# Patient Record
Sex: MALE | Race: WHITE | NOT HISPANIC OR LATINO | Employment: OTHER | URBAN - METROPOLITAN AREA
[De-identification: names, ages, dates, MRNs, and addresses within clinical notes are randomized per-mention and may not be internally consistent; named-entity substitution may affect disease eponyms.]

---

## 2017-03-09 ENCOUNTER — OFFICE VISIT (OUTPATIENT)
Dept: GASTROENTEROLOGY | Facility: CLINIC | Age: 52
End: 2017-03-09

## 2017-03-09 VITALS
HEIGHT: 72 IN | DIASTOLIC BLOOD PRESSURE: 88 MMHG | WEIGHT: 266 LBS | BODY MASS INDEX: 36.03 KG/M2 | SYSTOLIC BLOOD PRESSURE: 140 MMHG

## 2017-03-09 DIAGNOSIS — K50.111 CROHN'S COLITIS, WITH RECTAL BLEEDING (HCC): Primary | ICD-10-CM

## 2017-03-09 PROCEDURE — 99204 OFFICE O/P NEW MOD 45 MIN: CPT | Performed by: INTERNAL MEDICINE

## 2017-03-09 RX ORDER — MESALAMINE 1.2 G/1
4800 TABLET, DELAYED RELEASE ORAL
Qty: 120 TABLET | Refills: 11 | Status: SHIPPED | OUTPATIENT
Start: 2017-03-09 | End: 2017-09-19

## 2017-03-09 NOTE — PROGRESS NOTES
Chief Complaint   Patient presents with   • Crohn's Disease flare     Orlando Friedman is a 52 y.o. male who presents with a history of recurrent colitis   HPI     52-year-old male presents with recurrent rectal bleeding.  Patient reportedly was told 1 greater than 20 years ago that he had ulcerative colitis.  Patient presented approximately 3 years ago all most of the day with rectal bleeding to Dr. Navin Lee.  Colonoscopy at that time revealed colitis in the right colon as well as in the rectum.  Biopsies revealed chronic colitis with normal colon tree no segments.  Patient had been question whether this was Crohn's colitis or an overlap syndrome.  [facility] testing was recommended but not currently available.  At this point patient still with urgency for bowel movement as well as rectal bleeding.  Denies fever chills no weight loss.  Patient reports no sick contents with similar GI complaints.    Past Medical History   Diagnosis Date   • Crohn's disease        Current Outpatient Prescriptions:   •  ibuprofen (ADVIL,MOTRIN) 800 MG tablet, TAKE 1 TABLET 3 TIMES DAILY AS NEEDED., Disp: 90 tablet, Rfl: 1  •  Budesonide (UCERIS) 9 MG tablet sustained-release 24 hour, Take 1 tablet by mouth Daily., Disp: 14 tablet, Rfl: 0  •  HYDROcodone-acetaminophen (NORCO) 5-325 MG per tablet, Take 1-2 tablets by mouth every 4-6 hours prn for back pain., Disp: 40 tablet, Rfl: 0  •  mesalamine (LIALDA) 1.2 G EC tablet, Take 4 tablets by mouth Daily With Breakfast., Disp: 120 tablet, Rfl: 11  No Known Allergies  Social History     Social History   • Marital status:      Spouse name: N/A   • Number of children: N/A   • Years of education: N/A     Occupational History   • Not on file.     Social History Main Topics   • Smoking status: Former Smoker     Packs/day: 1.50     Types: Cigarettes     Quit date: 2009   • Smokeless tobacco: Not on file   • Alcohol use Yes   • Drug use: No   • Sexual activity: Not on file     Other Topics  Concern   • Not on file     Social History Narrative   • No narrative on file     History reviewed. No pertinent family history.  Review of Systems   Constitutional: Negative.    HENT: Negative.    Eyes: Negative.    Respiratory: Negative.    Cardiovascular: Negative.    Gastrointestinal: Positive for abdominal pain and blood in stool.   Endocrine: Negative.    Musculoskeletal: Negative.    Skin: Negative.    Allergic/Immunologic: Negative.    Hematological: Negative.      Vitals:    03/09/17 1104   BP: 140/88     Physical Exam   Constitutional: He is oriented to person, place, and time. He appears well-developed and well-nourished.   HENT:   Head: Normocephalic and atraumatic.   Eyes: Conjunctivae and EOM are normal. No scleral icterus.   Neck: Normal range of motion. No tracheal deviation present.   Cardiovascular: Normal rate and regular rhythm.  Exam reveals no gallop and no friction rub.    No murmur heard.  Pulmonary/Chest: Effort normal and breath sounds normal. No respiratory distress. He has no wheezes. He has no rales.   Abdominal: Soft. Bowel sounds are normal. He exhibits no distension and no mass. There is no tenderness. There is no rebound and no guarding.   Musculoskeletal: Normal range of motion. He exhibits no edema.   Lymphadenopathy:     He has no cervical adenopathy.   Neurological: He is alert and oriented to person, place, and time. No cranial nerve deficit.   Skin: Skin is warm and dry. No rash noted.   Psychiatric: He has a normal mood and affect. His behavior is normal.   Nursing note and vitals reviewed.    Diagnoses and all orders for this visit:    Crohn's colitis, with rectal bleeding  -     Case Request; Standing  -     Case Request    Other orders  -     Implement Anesthesia orders day of procedure.; Standing  -     Obtain informed consent; Standing  -     mesalamine (LIALDA) 1.2 G EC tablet; Take 4 tablets by mouth Daily With Breakfast.  -     Budesonide (UCERIS) 9 MG tablet  sustained-release 24 hour; Take 1 tablet by mouth Daily.     patient is a 52-year-old male with diagnosis of ulcerative colitis greater than 20 years ago.  Patient status post colonoscopy in 2014 that showed colitis, chronic, in the right colon and the rectum consistent with Crohn's colitis.  Patient with skip lesions with normal biopsies in between.  At the time patient complained of rectal bleeding and responded slowly to therapy.  Patient now returns with similar symptoms.  Patient reports urgency and the need to go the bathroom with little or no stool as well as rectal bleeding.  Patient reports trying the prescription of Rosa the that he got in 2014 without much success.  Patient now will be given new prescription, not 3 years old of Rosa the as well as a short course of Uceris to help boost the healing effect.  Once the bowel is more common treated will arrange repeat colonoscopy to evaluate the extent of disease.

## 2017-03-29 ENCOUNTER — HOSPITAL ENCOUNTER (OUTPATIENT)
Dept: GENERAL RADIOLOGY | Facility: HOSPITAL | Age: 52
Discharge: HOME | End: 2017-03-29
Admitting: FAMILY MEDICINE

## 2017-03-29 ENCOUNTER — OFFICE VISIT (OUTPATIENT)
Dept: FAMILY MEDICINE CLINIC | Facility: CLINIC | Age: 52
End: 2017-03-29

## 2017-03-29 VITALS
HEIGHT: 72 IN | WEIGHT: 268 LBS | SYSTOLIC BLOOD PRESSURE: 122 MMHG | TEMPERATURE: 97.8 F | OXYGEN SATURATION: 96 % | HEART RATE: 81 BPM | DIASTOLIC BLOOD PRESSURE: 82 MMHG | BODY MASS INDEX: 36.3 KG/M2

## 2017-03-29 DIAGNOSIS — M25.562 ACUTE PAIN OF LEFT KNEE: ICD-10-CM

## 2017-03-29 DIAGNOSIS — M25.562 ACUTE PAIN OF LEFT KNEE: Primary | ICD-10-CM

## 2017-03-29 DIAGNOSIS — K50.011 CROHN'S DISEASE OF SMALL INTESTINE WITH RECTAL BLEEDING (HCC): ICD-10-CM

## 2017-03-29 DIAGNOSIS — E66.09 EXOGENOUS OBESITY: ICD-10-CM

## 2017-03-29 DIAGNOSIS — M51.36 DEGENERATIVE DISC DISEASE, LUMBAR: ICD-10-CM

## 2017-03-29 PROCEDURE — 99213 OFFICE O/P EST LOW 20 MIN: CPT | Performed by: FAMILY MEDICINE

## 2017-03-29 PROCEDURE — 73562 X-RAY EXAM OF KNEE 3: CPT

## 2017-03-29 RX ORDER — HYDROCODONE BITARTRATE AND ACETAMINOPHEN 5; 325 MG/1; MG/1
TABLET ORAL
Qty: 40 TABLET | Refills: 0 | Status: SHIPPED | OUTPATIENT
Start: 2017-03-29 | End: 2017-09-19

## 2017-03-29 NOTE — PROGRESS NOTES
Subjective   Orlando Friedman is a 52 y.o. male with   Chief Complaint   Patient presents with   • Back Pain   • Knee Pain     left knee   .    History of Present Illness     Orlando is a 52 yr old white male that presents today for new onset of left knee pain.  He reports that he felt pain and a pop in his left knee after moving a few loads of rock with a wheel [place].  The pain was located to the lateral aspect of the left knee.  He has had no other injury to his left knee.  This happened on Saturday the 25th of March.  He reports that on Sunday the 26th his pain had mostly resolved.  He currently is without pain or effusion.  There has been no instability and the stiffness has resolved.    Orlando uses Norco for mild intermittent lower back pain secondary to degenerative disc disease of the lumbar spine and would like a refill.  His last fill of Norco was in August of 2016 of only 40 tablets that he has 10 left.  The use of Norco is very appropriate.      The following portions of the patient's history were reviewed and updated as appropriate: allergies, current medications, past family history, past medical history, past social history, past surgical history and problem list.    Review of Systems   Musculoskeletal: Positive for back pain.        Left knee pain   All other systems reviewed and are negative.      Objective     Vitals:    03/29/17 1439   BP: 122/82   Pulse: 81   Temp: 97.8 °F (36.6 °C)   SpO2: 96%     BP Readings from Last 3 Encounters:   03/29/17 122/82   03/09/17 140/88   12/22/15 140/84      Wt Readings from Last 3 Encounters:   03/29/17 268 lb (122 kg)   03/09/17 266 lb (121 kg)   12/22/15 272 lb 0.8 oz (123 kg)        Physical Exam   Constitutional: He is oriented to person, place, and time. He appears well-developed and well-nourished.   HENT:   Head: Normocephalic and atraumatic.   Pulmonary/Chest: Effort normal.   Musculoskeletal: Normal range of motion.        Left knee: He exhibits normal  range of motion, no swelling, no effusion, no ecchymosis, no deformity, no laceration, no erythema, no LCL laxity, normal patellar mobility, no bony tenderness, normal meniscus and no MCL laxity. No tenderness found. No medial joint line, no lateral joint line, no MCL, no LCL and no patellar tendon tenderness noted.   Left knee is observed without effusion, erythema, deformity or ecchymosis.  Minimal crepitance in the patellar region without patellar tenderness and entrapment is negative.  Ligaments are stable with negative Lachman and negative drawer sign.  Woodrow's is negative bilaterally.  Motor is 5 over 5 in neurovascular is intact distally.   Neurological: He is alert and oriented to person, place, and time. He has normal strength. No sensory deficit.   Skin: Skin is warm and dry. No rash noted. No erythema. No pallor.   Psychiatric: He has a normal mood and affect. His speech is normal and behavior is normal. Judgment and thought content normal. Cognition and memory are normal.   Nursing note and vitals reviewed.      Assessment/Plan   Orlando was seen today for back pain and knee pain.    Diagnoses and all orders for this visit:    Acute pain of left knee  -     XR Knee 3 View Left; Future    Degenerative disc disease, lumbar    Exogenous obesity    Crohn's disease of small intestine with rectal bleeding    Other orders  -     HYDROcodone-acetaminophen (NORCO) 5-325 MG per tablet; Take 1-2 tablets by mouth every 4-6 hours prn for back pain.        Return if symptoms worsen or fail to improve.    Scribed for Josue Harris MD by Harrison Mooney. 03/29/2017    IJosue MD personally performed the services described in this documentation, as scribed by Harrison Mooney in my presence, and it is both accurate and complete

## 2017-03-30 PROBLEM — E66.09 EXOGENOUS OBESITY: Status: ACTIVE | Noted: 2017-03-30

## 2017-03-30 PROBLEM — E78.5 HYPERLIPIDEMIA: Status: ACTIVE | Noted: 2017-03-30

## 2017-03-30 PROBLEM — N52.9 IMPOTENCE OF ORGANIC ORIGIN: Status: ACTIVE | Noted: 2017-03-30

## 2017-03-30 PROBLEM — N40.0 BENIGN PROSTATIC HYPERTROPHY WITHOUT URINARY OBSTRUCTION: Status: ACTIVE | Noted: 2017-03-30

## 2017-03-30 PROBLEM — E55.9 VITAMIN D DEFICIENCY: Status: ACTIVE | Noted: 2017-03-30

## 2017-03-30 PROBLEM — M51.36 DEGENERATIVE DISC DISEASE, LUMBAR: Status: ACTIVE | Noted: 2017-03-30

## 2017-03-30 PROBLEM — M51.369 DEGENERATIVE DISC DISEASE, LUMBAR: Status: ACTIVE | Noted: 2017-03-30

## 2017-03-30 PROBLEM — K50.011 CROHN'S DISEASE OF SMALL INTESTINE WITH RECTAL BLEEDING: Status: ACTIVE | Noted: 2017-03-30

## 2017-03-30 NOTE — PATIENT INSTRUCTIONS
X-rays of left knee will be obtained and activity will be as tolerated.  If episode recurs again will consider MRI of the left knee.  Hydrocodone will be refilled as has been done in the past.  Follow-up will be on an as-needed basis.

## 2017-05-31 ENCOUNTER — ANESTHESIA (OUTPATIENT)
Dept: GASTROENTEROLOGY | Facility: HOSPITAL | Age: 52
End: 2017-05-31

## 2017-05-31 ENCOUNTER — ANESTHESIA EVENT (OUTPATIENT)
Dept: GASTROENTEROLOGY | Facility: HOSPITAL | Age: 52
End: 2017-05-31

## 2017-05-31 ENCOUNTER — HOSPITAL ENCOUNTER (OUTPATIENT)
Facility: HOSPITAL | Age: 52
Setting detail: HOSPITAL OUTPATIENT SURGERY
Discharge: HOME | End: 2017-05-31
Attending: INTERNAL MEDICINE | Admitting: INTERNAL MEDICINE

## 2017-05-31 VITALS
TEMPERATURE: 98.5 F | HEIGHT: 72 IN | RESPIRATION RATE: 18 BRPM | SYSTOLIC BLOOD PRESSURE: 147 MMHG | HEART RATE: 80 BPM | OXYGEN SATURATION: 96 % | DIASTOLIC BLOOD PRESSURE: 96 MMHG | WEIGHT: 267.4 LBS | BODY MASS INDEX: 36.22 KG/M2

## 2017-05-31 DIAGNOSIS — K50.111 CROHN'S COLITIS, WITH RECTAL BLEEDING (HCC): ICD-10-CM

## 2017-05-31 PROBLEM — K50.90 CROHN'S DISEASE (HCC): Status: ACTIVE | Noted: 2017-05-31

## 2017-05-31 PROCEDURE — 45380 COLONOSCOPY AND BIOPSY: CPT | Performed by: INTERNAL MEDICINE

## 2017-05-31 PROCEDURE — S0260 H&P FOR SURGERY: HCPCS | Performed by: INTERNAL MEDICINE

## 2017-05-31 PROCEDURE — 88305 TISSUE EXAM BY PATHOLOGIST: CPT | Performed by: INTERNAL MEDICINE

## 2017-05-31 PROCEDURE — 25010000002 PROPOFOL 10 MG/ML EMULSION: Performed by: ANESTHESIOLOGY

## 2017-05-31 RX ORDER — SODIUM CHLORIDE, SODIUM LACTATE, POTASSIUM CHLORIDE, CALCIUM CHLORIDE 600; 310; 30; 20 MG/100ML; MG/100ML; MG/100ML; MG/100ML
1000 INJECTION, SOLUTION INTRAVENOUS CONTINUOUS PRN
Status: DISCONTINUED | OUTPATIENT
Start: 2017-05-31 | End: 2017-05-31 | Stop reason: HOSPADM

## 2017-05-31 RX ORDER — BUDESONIDE 3 MG/1
9 CAPSULE, COATED PELLETS ORAL EVERY MORNING
Qty: 90 CAPSULE | Refills: 11 | Status: SHIPPED | OUTPATIENT
Start: 2017-05-31 | End: 2017-09-19

## 2017-05-31 RX ORDER — PROPOFOL 10 MG/ML
VIAL (ML) INTRAVENOUS CONTINUOUS PRN
Status: DISCONTINUED | OUTPATIENT
Start: 2017-05-31 | End: 2017-05-31 | Stop reason: SURG

## 2017-05-31 RX ORDER — PROPOFOL 10 MG/ML
VIAL (ML) INTRAVENOUS CONTINUOUS PRN
Status: DISCONTINUED | OUTPATIENT
Start: 2017-05-31 | End: 2017-05-31

## 2017-05-31 RX ORDER — PROPOFOL 10 MG/ML
VIAL (ML) INTRAVENOUS AS NEEDED
Status: DISCONTINUED | OUTPATIENT
Start: 2017-05-31 | End: 2017-05-31 | Stop reason: SURG

## 2017-05-31 RX ORDER — LIDOCAINE HYDROCHLORIDE 20 MG/ML
INJECTION, SOLUTION INFILTRATION; PERINEURAL AS NEEDED
Status: DISCONTINUED | OUTPATIENT
Start: 2017-05-31 | End: 2017-05-31 | Stop reason: SURG

## 2017-05-31 RX ADMIN — PROPOFOL 160 MCG/KG/MIN: 10 INJECTION, EMULSION INTRAVENOUS at 09:36

## 2017-05-31 RX ADMIN — LIDOCAINE HYDROCHLORIDE 50 MG: 20 INJECTION, SOLUTION INFILTRATION; PERINEURAL at 09:34

## 2017-05-31 RX ADMIN — PROPOFOL 300 MG: 10 INJECTION, EMULSION INTRAVENOUS at 09:34

## 2017-05-31 RX ADMIN — SODIUM CHLORIDE, POTASSIUM CHLORIDE, SODIUM LACTATE AND CALCIUM CHLORIDE 1000 ML: 600; 310; 30; 20 INJECTION, SOLUTION INTRAVENOUS at 09:15

## 2017-06-01 LAB
CYTO UR: NORMAL
LAB AP CASE REPORT: NORMAL
Lab: NORMAL
PATH REPORT.FINAL DX SPEC: NORMAL
PATH REPORT.GROSS SPEC: NORMAL

## 2017-06-09 ENCOUNTER — TELEPHONE (OUTPATIENT)
Dept: GASTROENTEROLOGY | Facility: CLINIC | Age: 52
End: 2017-06-09

## 2017-06-09 NOTE — TELEPHONE ENCOUNTER
Called and spoke with pt's wife. Advised that Dr Herbert recommends for pt to stop taking the Lialda as this medication can occasionally cause diarrhea. Pt's wife verb understanding.

## 2017-06-09 NOTE — TELEPHONE ENCOUNTER
----- Message from Guy Fountain sent at 6/9/2017  9:36 AM EDT -----  Regarding: PT WIFE LENNIE CALLED  Contact: [phone]   PT WIFE LENNIE IS CALLING STATING HAD A SCOPE MAY 31ST & IS STILL HAVING PROBLEMS, PASSING GAS IN LIQUID FORM & HAS TO RUN TO THE BATHROOM, STOMACH PAIN.  GAVE THE PT SOME SAMPLES BUT DOESN'T REMEMBER WHAT THEY WERE CALLED, STATED ITS REALLY HELPED HIM & WOULD LIKE TO GET SOME MORE IF POSSIBLE.

## 2017-06-09 NOTE — TELEPHONE ENCOUNTER
Called pt's wife back. She states pt still having stomach cramping, he is passing a lot of gas and will sometimes pass stool too when he tries to pass gas, still having rectal bleeding (which has been going on for years). Pt's wife states pt just not getting nay better. Pt's wife states at the office visit, Dr Herbert gave pt some samples of a medication that she cannot remember the name of. She states pt took the med for about 10 days and it really helped. Asked if the samples were of Uceris. Pt's wife states yes, that sounds correct. Advised that after the scope, Dr Herbert called in budesonide which is the same active ingredient that is in Uceris. Asked if pt has been taking that. Pt's wife states yes, pt has been taking the Lialda and Uceris both every day. Advised will let Dr Herbert know and will call back with his recommendations. Pt's wife verb understanding.

## 2017-06-16 ENCOUNTER — TELEPHONE (OUTPATIENT)
Dept: GASTROENTEROLOGY | Facility: CLINIC | Age: 52
End: 2017-06-16

## 2017-06-16 NOTE — TELEPHONE ENCOUNTER
Returned spouse's phone call, she states the patient is having more rectal bleeding than in the past and increase abdominal pain.  Wanted to know what medications he can take? Advised an update will be sent to Dr. Herbert and will talk to  on Monday regarding an appointment. Advised if patient's symptoms worsen to go to the ER for an evaluation. She verb understanding and is agreeable to the plan.

## 2017-06-16 NOTE — TELEPHONE ENCOUNTER
----- Message from Guy Fountain sent at 6/16/2017  3:34 PM EDT -----  Regarding: PT WIFE LENNIE CALLED  Contact: [phone]   PT WIFE LENNIE IS CALLING STATED HER  IS HAVING SOME RECTAL BLEEDING & ABD PAIN. [facility] NEXT APPT WAS A MONTH AWAY & WIFE DOESN'T WANT TO WAIT THAT LONG NOR SEE A NP. SO SHE WOULD LIKE A CALL BACK TO DISCUSS WHAT HER  CAN TAKE.

## 2017-06-19 NOTE — TELEPHONE ENCOUNTER
Called patient's spouse, no answer, left message there is an opening for Dr. Herbert on 6/22/17@5233.  Advised to call back for confirmation.

## 2017-07-12 ENCOUNTER — TELEPHONE (OUTPATIENT)
Dept: FAMILY MEDICINE CLINIC | Facility: CLINIC | Age: 52
End: 2017-07-12

## 2017-07-12 DIAGNOSIS — IMO0002 CYST OF NECK: Primary | ICD-10-CM

## 2017-07-12 NOTE — TELEPHONE ENCOUNTER
"pts wife calling to request referral to doctor to have \"cyst\" removed from neck, has had done before.  "

## 2017-07-19 ENCOUNTER — TELEPHONE (OUTPATIENT)
Dept: GASTROENTEROLOGY | Facility: CLINIC | Age: 52
End: 2017-07-19

## 2017-07-19 RX ORDER — MESALAMINE 1000 MG/1
1000 SUPPOSITORY RECTAL NIGHTLY
Qty: 30 SUPPOSITORY | Refills: 0 | Status: SHIPPED | OUTPATIENT
Start: 2017-07-19 | End: 2017-08-30

## 2017-07-19 NOTE — TELEPHONE ENCOUNTER
Called pt and advised that Dr Bowling recommends that since it does not sound like he has gotten any better after stopping the Lialda, she recommends to resume the Lialda at 4 tablets per day and also continue taking the budesonide as well. Advised that she also sent a script for Canasa suppositories to his pharmacy for him to use at night time to help with his urgency and bleeding as it appears most of his inflammation was in his rectum on recent scope. Advised that he can continue using imodium as needed for diarrhea. Advised that she recommends to schedule a f/u with Dr Piedad BEACH upon his return. Advised can move his appt up so he sees MD sooner. Pt verb understanding and will call back to move appt up.

## 2017-07-19 NOTE — TELEPHONE ENCOUNTER
Called pt back. Pt states he's still having watery, runny diarrhea, he's still seeing bright red blood in his stool and having fecal urgency. Pt states he's having 5-10 BMs per day. Pt currently taking budesonide 3 capsules QAM and Imodium 4 capsules BID, depending on the day. If he's at home, he does not take Imodium. Pt states he stopped Lialda per Dr Herbert's instructions. Advised will send a message to one of Dr Herbert's associates for recommendations and will call back. Pt verb understanding.

## 2017-07-19 NOTE — TELEPHONE ENCOUNTER
Doesn't sound like he got any better after stopping lialda so would resume at 4 po daily, will also send canasa to his pharmacy to try for the urgency and bleeding as it appears that most of his inflammation was in the rectum on recent scope - schedule f/u with dr doyle kothari when he returns.  Ok to cont imodium as well + uceris (budesonide)

## 2017-07-19 NOTE — TELEPHONE ENCOUNTER
----- Message from Guy Fountain sent at 7/19/2017  8:40 AM EDT -----  Regarding: PT CALLED  Contact: [phone]   PT IS CALLING STATING IS STILL HAVING RUNNY BOWEL MOVEMENTS & BLOOD IN HIS STOOL. PT HAD A C/S IN MAY 2017. 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

## 2017-09-19 ENCOUNTER — OFFICE VISIT (OUTPATIENT)
Dept: SURGERY | Facility: CLINIC | Age: 52
End: 2017-09-19

## 2017-09-19 VITALS
OXYGEN SATURATION: 98 % | HEIGHT: 72 IN | WEIGHT: 276.1 LBS | SYSTOLIC BLOOD PRESSURE: 160 MMHG | BODY MASS INDEX: 37.4 KG/M2 | DIASTOLIC BLOOD PRESSURE: 100 MMHG | HEART RATE: 96 BPM

## 2017-09-19 DIAGNOSIS — L08.9 INFECTED SEBACEOUS CYST: Primary | ICD-10-CM

## 2017-09-19 DIAGNOSIS — L72.3 INFECTED SEBACEOUS CYST: Primary | ICD-10-CM

## 2017-09-19 PROCEDURE — 10060 I&D ABSCESS SIMPLE/SINGLE: CPT | Performed by: SURGERY

## 2017-09-19 NOTE — PROGRESS NOTES
Subjective   Orlando Friedman is a 52 y.o. male who presents to the office in surgical consultation from Josue Harris DO for an infected sebaceous cyst.    History of Present Illness     The patient has had sebaceous cyst in the past.  He recently developed a small sebaceous cyst of the upper back that then became a large and very painful.  He has not had any drainage.  There has been no trauma to the area.    Review of Systems   Constitutional: Negative for activity change, appetite change, fatigue and fever.   HENT: Negative for trouble swallowing and voice change.    Respiratory: Negative for chest tightness and shortness of breath.    Cardiovascular: Negative for chest pain and palpitations.   Gastrointestinal: Negative for abdominal pain, blood in stool, constipation, diarrhea, nausea and vomiting.   Endocrine: Negative for cold intolerance and heat intolerance.   Genitourinary: Negative for dysuria and flank pain.   Neurological: Negative for dizziness and light-headedness.   Hematological: Negative for adenopathy. Does not bruise/bleed easily.   Psychiatric/Behavioral: Negative for agitation and confusion.     Past Medical History:   Diagnosis Date   • Crohn's disease    • Skin cyst      Past Surgical History:   Procedure Laterality Date   • COLONOSCOPY  2013 approx    Crohns per patiient   • COLONOSCOPY N/A 5/31/2017    Procedure: COLONOSCOPY TO CECUM/TI WITH POLYPECTOMY (COLD BX) AND BIOPSY;  Surgeon: Erwin Herbert MD;  Location: [facility] ENDOSCOPY;  Service: cogested mucosa in rectum, chronic active inflammation moderate to severe, acute cryptitis,    • WISDOM TOOTH EXTRACTION       Family History   Problem Relation Age of Onset   • Breast cancer Sister      Social History     Social History   • Marital status:      Spouse name: N/A   • Number of children: N/A   • Years of education: N/A     Occupational History   •       Social History Main Topics   • Smoking status: Former Smoker      Packs/day: 1.50     Types: Cigarettes     Quit date: 2009   • Smokeless tobacco: Never Used   • Alcohol use Yes   • Drug use: No   • Sexual activity: Defer     Other Topics Concern   • Not on file     Social History Narrative       Objective   Physical Exam   Constitutional: He is oriented to person, place, and time. He appears well-developed and well-nourished.  Non-toxic appearance.   Eyes: EOM are normal. No scleral icterus.   Pulmonary/Chest: Effort normal. No respiratory distress.   Abdominal: Normal appearance.   Neurological: He is alert and oriented to person, place, and time.   Skin: Skin is warm and dry.   There is a 4 cm infected sebaceous cyst of the upper back with palpable fluctuance and overlying erythema.   Psychiatric: He has a normal mood and affect. His behavior is normal. Judgment and thought content normal.     Procedure  The area of the sebaceous cyst was prepped and draped in the standard surgical fashion.  It was infiltrated with 1% lidocaine without epinephrine.  An incision was made with a scalpel carried through the skin into the abscess cavity.  Purulent material was encountered and the abscess was completely evacuated.  It was irrigated with the local anesthetic.  The cavity was then packed with iodoform gauze.  A sterile dressing was applied.  The patient tolerated procedure well.    Assessment/Plan       The encounter diagnosis was Infected sebaceous cyst.    The patient had an infected sebaceous cyst of the upper back.  An incision and drainage of the abscess was performed in the office.  Local wound care was discussed with him.  He will return to the office if a cyst recurs.

## 2017-11-15 ENCOUNTER — TELEPHONE (OUTPATIENT)
Dept: GASTROENTEROLOGY | Facility: CLINIC | Age: 52
End: 2017-11-15

## 2017-11-15 NOTE — TELEPHONE ENCOUNTER
----- Message from Erwin Herbert MD sent at 11/15/2017  1:16 PM EST -----  Pt with chronic colitis, never came back for follow up.  Please call to schedule

## 2017-11-15 NOTE — TELEPHONE ENCOUNTER
Patient called, advised of Dr. Herbert's note. He states he could not talk at the moment and will call back.

## 2018-01-18 ENCOUNTER — OFFICE VISIT (OUTPATIENT)
Dept: FAMILY MEDICINE CLINIC | Facility: CLINIC | Age: 53
End: 2018-01-18

## 2018-01-18 VITALS
DIASTOLIC BLOOD PRESSURE: 82 MMHG | HEART RATE: 83 BPM | WEIGHT: 275 LBS | BODY MASS INDEX: 37.25 KG/M2 | OXYGEN SATURATION: 98 % | HEIGHT: 72 IN | TEMPERATURE: 99 F | SYSTOLIC BLOOD PRESSURE: 132 MMHG

## 2018-01-18 DIAGNOSIS — M51.36 DEGENERATIVE DISC DISEASE, LUMBAR: Primary | ICD-10-CM

## 2018-01-18 DIAGNOSIS — E66.09 EXOGENOUS OBESITY: ICD-10-CM

## 2018-01-18 PROCEDURE — 99213 OFFICE O/P EST LOW 20 MIN: CPT | Performed by: FAMILY MEDICINE

## 2018-01-18 RX ORDER — IBUPROFEN 800 MG/1
800 TABLET ORAL EVERY 8 HOURS PRN
Qty: 90 TABLET | Refills: 3 | Status: SHIPPED | OUTPATIENT
Start: 2018-01-18 | End: 2018-08-21 | Stop reason: SDUPTHER

## 2018-01-18 RX ORDER — HYDROCODONE BITARTRATE AND ACETAMINOPHEN 5; 325 MG/1; MG/1
1 TABLET ORAL EVERY 6 HOURS PRN
COMMUNITY
End: 2018-01-18 | Stop reason: SDUPTHER

## 2018-01-18 RX ORDER — IBUPROFEN 800 MG/1
800 TABLET ORAL EVERY 6 HOURS PRN
COMMUNITY
End: 2018-01-18 | Stop reason: SDUPTHER

## 2018-01-18 RX ORDER — HYDROCODONE BITARTRATE AND ACETAMINOPHEN 5; 325 MG/1; MG/1
1 TABLET ORAL EVERY 6 HOURS PRN
Qty: 40 TABLET | Refills: 0 | Status: SHIPPED | OUTPATIENT
Start: 2018-01-18 | End: 2018-08-23 | Stop reason: SDUPTHER

## 2018-01-19 NOTE — PROGRESS NOTES
Subjective   Orlando Friedman is a 52 y.o. male with   Chief Complaint   Patient presents with   • Back Pain     here for follow up for medication, Ibuprofen and hydrocodone.  He doesnt know strengths.   • Med Refill   .    History of Present Illness   52-year-old white male with known history of degenerative disc disease lumbar spine.  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  This process exacerbates his low back pain.  He does use prescriptive ibuprofen and on rare occasions uses hydrocodone.  He is eligible for [facility]CI but has not made the decision to do such.  He has been instructed in a low back home program that he admits to not participating in frequently.  He has also gained increased amounts weight over the last 5-6 years.  The following portions of the patient's history were reviewed and updated as appropriate: allergies, current medications, past family history, past medical history, past social history, past surgical history and problem list.    Review of Systems   Musculoskeletal: Positive for back pain.       Objective     Vitals:    01/18/18 1408   BP: 132/82   Pulse: 83   Temp: 99 °F (37.2 °C)   SpO2: 98%       No results found for this or any previous visit (from the past 168 hour(s)).    Physical Exam   Constitutional: He is oriented to person, place, and time. He appears well-developed and well-nourished.   HENT:   Head: Normocephalic and atraumatic.   Neurological: He is alert and oriented to person, place, and time.   Skin: Skin is warm. No rash noted.   Psychiatric: He has a normal mood and affect. His speech is normal and behavior is normal. Judgment and thought content normal. Cognition and memory are normal.   Nursing note and vitals reviewed.      Assessment/Plan   Orlando was seen today for back pain and med refill.    Diagnoses and  all orders for this visit:    Degenerative disc disease, lumbar  -     HYDROcodone-acetaminophen (NORCO) 5-325 MG per tablet; Take 1 tablet by mouth Every 6 (Six) Hours As Needed for Moderate Pain .  -     ibuprofen (ADVIL,MOTRIN) 800 MG tablet; Take 1 tablet by mouth Every 8 (Eight) Hours As Needed for Mild Pain .    Exogenous obesity        Return in about 6 months (around 7/18/2018) for Recheck.

## 2018-07-31 ENCOUNTER — OFFICE VISIT (OUTPATIENT)
Dept: SURGERY | Facility: CLINIC | Age: 53
End: 2018-07-31

## 2018-07-31 VITALS
WEIGHT: 275 LBS | OXYGEN SATURATION: 98 % | HEIGHT: 72 IN | DIASTOLIC BLOOD PRESSURE: 120 MMHG | HEART RATE: 73 BPM | SYSTOLIC BLOOD PRESSURE: 160 MMHG | BODY MASS INDEX: 37.25 KG/M2

## 2018-07-31 DIAGNOSIS — L72.3 SEBACEOUS CYST: Primary | ICD-10-CM

## 2018-07-31 PROCEDURE — 99213 OFFICE O/P EST LOW 20 MIN: CPT | Performed by: SURGERY

## 2018-07-31 RX ORDER — CEPHALEXIN 500 MG/1
500 CAPSULE ORAL 3 TIMES DAILY
Qty: 30 CAPSULE | Refills: 0 | Status: SHIPPED | OUTPATIENT
Start: 2018-07-31 | End: 2018-08-10

## 2018-07-31 NOTE — PROGRESS NOTES
Subjective   Orlando Friedman is a 53 y.o. male who returns to the office for a recurrent sebaceous cyst.    History of Present Illness     The patient had an infected sebaceous cyst and was seen in the office on 9/19/2018 where an incision and drainage of the abscess was performed.  He recovered well from that procedure and had no problems until the past several weeks when he has developed 2 sebaceous cysts.  One rapidly enlarged and was tender but has improved.  It never drained.  The second has remained small but is similar to the previous sebaceous cyst that was infected.    Review of Systems   Constitutional: Negative for activity change, appetite change, fatigue and fever.   HENT: Negative for trouble swallowing and voice change.    Respiratory: Negative for chest tightness and shortness of breath.    Cardiovascular: Negative for chest pain and palpitations.   Gastrointestinal: Negative for abdominal pain, blood in stool, constipation, diarrhea, nausea and vomiting.   Endocrine: Negative for cold intolerance and heat intolerance.   Genitourinary: Negative for dysuria and flank pain.   Neurological: Negative for dizziness and light-headedness.   Hematological: Negative for adenopathy. Does not bruise/bleed easily.   Psychiatric/Behavioral: Negative for agitation and confusion.     Past Medical History:   Diagnosis Date   • Crohn's disease (CMS/HCC)    • Skin cyst      Past Surgical History:   Procedure Laterality Date   • COLONOSCOPY  2013 approx    Crohns per patiient   • COLONOSCOPY N/A 5/31/2017    Procedure: COLONOSCOPY TO CECUM/TI WITH POLYPECTOMY (COLD BX) AND BIOPSY;  Surgeon: Erwin Herbert MD;  Location: [facility] ENDOSCOPY;  Service: cogested mucosa in rectum, chronic active inflammation moderate to severe, acute cryptitis,    • WISDOM TOOTH EXTRACTION       Family History   Problem Relation Age of Onset   • Breast cancer Sister      Social History     Social History   • Marital status:       Spouse name: N/A   • Number of children: N/A   • Years of education: N/A     Occupational History   •       Social History Main Topics   • Smoking status: Former Smoker     Packs/day: 1.50     Types: Cigarettes     Quit date: 2009   • Smokeless tobacco: Never Used   • Alcohol use Yes   • Drug use: No   • Sexual activity: Defer     Other Topics Concern   • Not on file     Social History Narrative   • No narrative on file       Objective   Physical Exam   Constitutional: He is oriented to person, place, and time. He appears well-developed and well-nourished.  Non-toxic appearance.   Eyes: EOM are normal. No scleral icterus.   Pulmonary/Chest: Effort normal. No respiratory distress.   Abdominal: Normal appearance.   Neurological: He is alert and oriented to person, place, and time.   Skin: Skin is warm and dry.   There are 2 separate sebaceous cyst of the upper back.  On the left upper back is a 2 cm sebaceous cyst with no erythema but mild fluctuance.  There is no tenderness.  Adjacent to this, closer to the midline, is a 1 cm uncomplicated sebaceous cyst.   Psychiatric: He has a normal mood and affect. His behavior is normal. Judgment and thought content normal.       Assessment/Plan       The encounter diagnosis was Sebaceous cyst.    The patient has 2 sebaceous cysts.  One has a low-grade infection and will be treated with Keflex.  Second is uncomplicated.  He has been scheduled for an excision of both the sebaceous cyst.  The patient understands the indications, alternatives, risks, and benefits of the procedure and wishes to proceed.

## 2018-08-01 ENCOUNTER — APPOINTMENT (OUTPATIENT)
Dept: PREADMISSION TESTING | Facility: HOSPITAL | Age: 53
End: 2018-08-01

## 2018-08-01 VITALS
WEIGHT: 278.8 LBS | BODY MASS INDEX: 37.76 KG/M2 | SYSTOLIC BLOOD PRESSURE: 167 MMHG | TEMPERATURE: 98.5 F | HEIGHT: 72 IN | OXYGEN SATURATION: 95 % | RESPIRATION RATE: 18 BRPM | DIASTOLIC BLOOD PRESSURE: 107 MMHG | HEART RATE: 81 BPM

## 2018-08-01 LAB
ANION GAP SERPL CALCULATED.3IONS-SCNC: 11.9 MMOL/L
BUN BLD-MCNC: 16 MG/DL (ref 6–20)
BUN/CREAT SERPL: 17.8 (ref 7–25)
CALCIUM SPEC-SCNC: 9.6 MG/DL (ref 8.6–10.5)
CHLORIDE SERPL-SCNC: 101 MMOL/L (ref 98–107)
CO2 SERPL-SCNC: 26.1 MMOL/L (ref 22–29)
CREAT BLD-MCNC: 0.9 MG/DL (ref 0.76–1.27)
DEPRECATED RDW RBC AUTO: 41.4 FL (ref 37–54)
ERYTHROCYTE [DISTWIDTH] IN BLOOD BY AUTOMATED COUNT: 12.9 % (ref 11.5–14.5)
GFR SERPL CREATININE-BSD FRML MDRD: 89 ML/MIN/1.73
GLUCOSE BLD-MCNC: 121 MG/DL (ref 65–99)
HCT VFR BLD AUTO: 44.8 % (ref 40.4–52.2)
HGB BLD-MCNC: 15 G/DL (ref 13.7–17.6)
MCH RBC QN AUTO: 29.8 PG (ref 27–32.7)
MCHC RBC AUTO-ENTMCNC: 33.5 G/DL (ref 32.6–36.4)
MCV RBC AUTO: 89.1 FL (ref 79.8–96.2)
PLATELET # BLD AUTO: 236 10*3/MM3 (ref 140–500)
PMV BLD AUTO: 10.3 FL (ref 6–12)
POTASSIUM BLD-SCNC: 4.5 MMOL/L (ref 3.5–5.2)
RBC # BLD AUTO: 5.03 10*6/MM3 (ref 4.6–6)
SODIUM BLD-SCNC: 139 MMOL/L (ref 136–145)
WBC NRBC COR # BLD: 5.97 10*3/MM3 (ref 4.5–10.7)

## 2018-08-01 PROCEDURE — 93010 ELECTROCARDIOGRAM REPORT: CPT | Performed by: INTERNAL MEDICINE

## 2018-08-01 PROCEDURE — 85027 COMPLETE CBC AUTOMATED: CPT | Performed by: SURGERY

## 2018-08-01 PROCEDURE — 36415 COLL VENOUS BLD VENIPUNCTURE: CPT

## 2018-08-01 PROCEDURE — 93005 ELECTROCARDIOGRAM TRACING: CPT

## 2018-08-01 PROCEDURE — 80048 BASIC METABOLIC PNL TOTAL CA: CPT | Performed by: SURGERY

## 2018-08-01 NOTE — DISCHARGE INSTRUCTIONS
PLEASE ARRIVE AT 1:00PM ON 8/1/2018      Take the following medications the morning of surgery with a small sip of water:        General Instructions:  • Do not eat solid food after midnight the night before surgery.  • You may drink clear liquids day of surgery but must stop at least one hour before your hospital arrival time. **STOP FLUIDS AT 12:00PM ON DAY OF SURGERY**  • It is beneficial for you to have a clear drink that contains carbohydrates the day of surgery.  We suggest a 12 to 20 ounce bottle of Gatorade or Powerade for non-diabetic patients or a 12 to 20 ounce bottle of G2 or Powerade Zero for diabetic patients. (Pediatric patients, are not advised to drink a 12 to 20 ounce carbohydrate drink)    Clear liquids are liquids you can see through.  Nothing red in color.     Plain water                               Sports drinks  Sodas                                   Gelatin (Jell-O)  Fruit juices without pulp such as white grape juice and apple juice  Popsicles that contain no fruit or yogurt  Tea or coffee (no cream or milk added)  Gatorade / Powerade  G2 / Powerade Zero    • Infants may have breast milk up to four hours before surgery.  • Infants drinking formula may drink formula up to six hours before surgery.   • Patients who avoid smoking, chewing tobacco and alcohol for 4 weeks prior to surgery have a reduced risk of post-operative complications.  Quit smoking as many days before surgery as you can.  • Do not smoke, use chewing tobacco or drink alcohol the day of surgery.   • If applicable bring your C-PAP/ BI-PAP machine.  • Bring any papers given to you in the doctor’s office.  • Wear clean comfortable clothes and socks.  • Do not wear contact lenses or make-up.  Bring a case for your glasses.   • Bring crutches or walker if applicable.  • Remove all piercings.  Leave jewelry and any other valuables at home.  • Hair extensions with metal clips must be removed prior to surgery.  • The Pre-Admission  Testing nurse will instruct you to bring medications if unable to obtain an accurate list in Pre-Admission Testing.          Preventing a Surgical Site Infection:  • For 2 to 3 days before surgery, avoid shaving with a razor because the razor can irritate skin and make it easier to develop an infection.    • Any areas of open skin can increase the risk of a post-operative wound infection by allowing bacteria to enter and travel throughout the body.  Notify your surgeon if you have any skin wounds / rashes even if it is not near the expected surgical site.  The area will need assessed to determine if surgery should be delayed until it is healed.  • The night prior to surgery sleep in a clean bed with clean clothing.  Do not allow pets to sleep with you.  • Shower on the morning of surgery using a fresh bar of anti-bacterial soap (such as Dial) and clean washcloth.  Dry with a clean towel and dress in clean clothing.  • Ask your surgeon if you will be receiving antibiotics prior to surgery.  • Make sure you, your family, and all healthcare providers clean their hands with soap and water or an alcohol based hand  before caring for you or your wound.    Day of surgery:  Upon arrival, a Pre-op nurse and Anesthesiologist will review your health history, obtain vital signs, and answer questions you may have.  The only belongings needed at this time will be your home medications and if applicable your C-PAP/BI-PAP machine.  If you are staying overnight your family can leave the rest of your belongings in the car and bring them to your room later.  A Pre-op nurse will start an IV and you may receive medication in preparation for surgery, including something to help you relax.  Your family will be able to see you in the Pre-op area.  While you are in surgery your family should notify the waiting room  if they leave the waiting room area and provide a contact phone number.    Please be aware that surgery  does come with discomfort.  We want to make every effort to control your discomfort so please discuss any uncontrolled symptoms with your nurse.   Your doctor will most likely have prescribed pain medications.      If you are going home after surgery you will receive individualized written care instructions before being discharged.  A responsible adult must drive you to and from the hospital on the day of your surgery and stay with you for 24 hours.    If you are staying overnight following surgery, you will be transported to your hospital room following the recovery period.  [facility] has all private rooms.    You have received a list of surgical assistants for your reference.  If you have any questions please call Pre-Admission Testing at [phone].  Deductibles and co-payments are collected on the day of service. Please be prepared to pay the required co-pay, deductible or deposit on the day of service as defined by your plan.

## 2018-08-06 ENCOUNTER — DOCUMENTATION (OUTPATIENT)
Dept: SURGERY | Facility: CLINIC | Age: 53
End: 2018-08-06

## 2018-08-06 DIAGNOSIS — R94.31 ABNORMAL EKG: Primary | ICD-10-CM

## 2018-08-06 NOTE — PROGRESS NOTES
The patient is scheduled to have an excision of a soft tissue neoplasm of his back.  Preop EKG showed T-wave abnormalities which were suspicious for ischemia.  The surgery has been canceled and he has been referred to cardiology for evaluation.

## 2018-08-07 NOTE — PROGRESS NOTES
Date of Office Visit: 2018  Encounter Provider: Rafaela Ramachandran MA  Place of Service: [facility]  Patient Name: Orlando Friedman  :1965    No chief complaint on file.  :     HPI: Orlando Friedman is a 53 y.o. male who presents today to ***      Past Medical History:   Diagnosis Date   • Back pain    • Colitis    • Crohn's disease (CMS/HCC)    • Dizziness    • Skin cyst    • Trouble swallowing        Past Surgical History:   Procedure Laterality Date   • COLONOSCOPY   approx    Crohns per patiient   • COLONOSCOPY N/A 2017    Procedure: COLONOSCOPY TO CECUM/TI WITH POLYPECTOMY (COLD BX) AND BIOPSY;  Surgeon: Erwin Herbert MD;  Location: [facility] ENDOSCOPY;  Service: cogested mucosa in rectum, chronic active inflammation moderate to severe, acute cryptitis,    • CYST REMOVAL     • WISDOM TOOTH EXTRACTION         Social History     Social History   • Marital status:      Spouse name: N/A   • Number of children: N/A   • Years of education: N/A     Occupational History   •       Social History Main Topics   • Smoking status: Former Smoker     Packs/day: 1.50     Types: Cigarettes     Quit date:    • Smokeless tobacco: Never Used   • Alcohol use Yes      Comment: WEEKENDS   • Drug use: No   • Sexual activity: Defer     Other Topics Concern   • Not on file     Social History Narrative   • No narrative on file       Family History   Problem Relation Age of Onset   • Breast cancer Sister    • Malig Hyperthermia Neg Hx        ROS    No Known Allergies      Current Outpatient Prescriptions:   •  cephalexin (KEFLEX) 500 MG capsule, Take 1 capsule by mouth 3 (Three) Times a Day for 10 days., Disp: 30 capsule, Rfl: 0  •  HYDROcodone-acetaminophen (NORCO) 5-325 MG per tablet, Take 1 tablet by mouth Every 6 (Six) Hours As Needed for Moderate Pain ., Disp: 40 tablet, Rfl: 0  •  ibuprofen (ADVIL,MOTRIN) 800 MG tablet, Take 1 tablet by mouth Every 8  (Eight) Hours As Needed for Mild Pain ., Disp: 90 tablet, Rfl: 3      Objective:     There were no vitals filed for this visit.  There is no height or weight on file to calculate BMI.    Physical Exam    Procedures      Assessment:      No diagnosis found.       Plan:       ***    As always, it has been a pleasure to participate in your patient's care.      Sincerely,       Rafaela Ramachandran MA

## 2018-08-07 NOTE — PROGRESS NOTES
Date of Office Visit: 2018  Encounter Provider: Michael De Luna MD  Place of Service: [facility] CARDIOLOGY  Patient Name: Orlando rFiedman  :1965    Chief Complaint   Patient presents with   • Pre-op Exam     CYST REMOVAL    • Abnormal ECG   :     HPI: Orlando Friedman is a 53 y.o. male who presents today in consultation for preoperative risk assessment at the request of Dr. Sauer.  He will have having sebaceous cysts removed under general anesthesia.  He went to [facility] and his EKG showed T wave inversions in III and aVF.      He denies chest pain, dyspnea, orthopnea, PND, palpitations, lightheadedness, syncope, or edema.  He denies any family history of premature CAD.  He denies diabetes, hyperlipidemia, or hypertension, although his BP has been very elevated recently.  He was diagnosed with RICH years ago but doesn't wear CPAP.    Past Medical History:   Diagnosis Date   • Back pain    • Benign prostatic hypertrophy without urinary obstruction 3/30/2017   • Colitis    • Crohn's disease (CMS/HCC)    • Degenerative disc disease, lumbar 3/30/2017   • Hyperlipidemia 3/30/2017   • Impotence of organic origin 3/30/2017   • Sebaceous cyst    • Vitamin D deficiency 3/30/2017       Past Surgical History:   Procedure Laterality Date   • COLONOSCOPY   approx    Crohns per patiient   • COLONOSCOPY N/A 2017    Procedure: COLONOSCOPY TO CECUM/TI WITH POLYPECTOMY (COLD BX) AND BIOPSY;  Surgeon: Erwin Herbert MD;  Location: [facility] ENDOSCOPY;  Service: cogested mucosa in rectum, chronic active inflammation moderate to severe, acute cryptitis,    • CYST REMOVAL     • WISDOM TOOTH EXTRACTION         Social History     Social History   • Marital status:      Spouse name: N/A   • Number of children: N/A   • Years of education: N/A     Occupational History   •       Social History Main Topics   • Smoking status: Former Smoker     Packs/day: 1.50     Types: Cigarettes      "Quit date: 2009   • Smokeless tobacco: Never Used      Comment: CAFFEINE USE: ONE CUP DAILY.   • Alcohol use 6.0 oz/week     10 Cans of beer per week      Comment: WEEKENDS   • Drug use: No   • Sexual activity: Defer     Other Topics Concern   • Not on file     Social History Narrative   • No narrative on file       Family History   Problem Relation Age of Onset   • Breast cancer Sister    • Sudden death Father 68   • Malig Hyperthermia Neg Hx        Review of Systems   Constitution: Positive for malaise/fatigue.   Cardiovascular: Negative for chest pain and palpitations.   Respiratory: Negative for shortness of breath.    Skin:        Sebaceous cyst   Neurological: Positive for light-headedness.        \"LIGHT-HEADED WHEN BENDING OVER\"   All other systems reviewed and are negative.      No Known Allergies      Current Outpatient Prescriptions:   •  cephalexin (KEFLEX) 500 MG capsule, Take 500 mg by mouth 3 (Three) Times a Day., Disp: , Rfl:   •  HYDROcodone-acetaminophen (NORCO) 5-325 MG per tablet, Take 1 tablet by mouth Every 6 (Six) Hours As Needed for Moderate Pain ., Disp: 40 tablet, Rfl: 0  •  ibuprofen (ADVIL,MOTRIN) 800 MG tablet, Take 1 tablet by mouth Every 8 (Eight) Hours As Needed for Mild Pain ., Disp: 90 tablet, Rfl: 3  •  omeprazole (priLOSEC) 40 MG capsule, TAKE 1 CAPSULE BY MOUTH EVERY DAY BEFORE A MEAL, Disp: , Rfl: 1      Objective:     Vitals:    08/13/18 0852   BP: (!) 140/102   BP Location: Left arm   Pulse: 86   Weight: 123 kg (272 lb)   Height: 182.9 cm (72\")     Body mass index is 36.89 kg/m².    Physical Exam   Constitutional: He is oriented to person, place, and time.   Obese   HENT:   Head: Normocephalic.   Nose: Nose normal.   Mouth/Throat: Oropharynx is clear and moist.   Eyes: Pupils are equal, round, and reactive to light. Conjunctivae and EOM are normal.   Neck: Normal range of motion.   Cannot assess for JVD due to habitus   Cardiovascular: Normal rate, regular rhythm, normal heart " sounds and intact distal pulses.    No murmur heard.  Pulmonary/Chest: Effort normal.   Abdominal: Soft.   Obesity limits abdominal exam   Musculoskeletal: Normal range of motion. He exhibits no edema.   Neurological: He is alert and oriented to person, place, and time. No cranial nerve deficit.   Skin: Skin is warm and dry. No rash noted.   Psychiatric: He has a normal mood and affect. His behavior is normal. Judgment and thought content normal.   Vitals reviewed.        ECG 12 Lead  Date/Time: 8/13/2018 12:59 PM  Performed by: MICHAEL DE LUNA  Authorized by: MICHAEL DE LUNA   Comparison: compared with previous ECG   Similar to previous ECG  Comparison to previous ECG: Lead placement change is noted  Rhythm: sinus rhythm  Conduction: conduction normal  ST Segments: ST segments normal  T depression: aVF  QRS axis: normal  Other: no other findings  Clinical impression: non-specific ECG              Assessment:       Diagnosis Plan   1. Abnormal EKG  Adult Transthoracic Echo Complete W/ Cont if Necessary Per Protocol   2. Elevated blood pressure reading in office without diagnosis of hypertension  Adult Transthoracic Echo Complete W/ Cont if Necessary Per Protocol   3. Obstructive sleep apnea syndrome  Adult Transthoracic Echo Complete W/ Cont if Necessary Per Protocol   4. Preop cardiovascular exam            Plan:       I suspect there was a lead placement issue on the first EKG.  We repeated it in our office today and the QRS morphology in lead III is completely different.  He has some nonspecific T wave abnormality in aVF.  I suspect this is hypertensive; I will check an echocardiogram.  He does not need a stress test prior to surgery.    His blood pressure is quite elevated.  He has untreated RICH and I asked him to get back in with sleep medicine and to follow up with Dr. Harris regarding his BP.       Sincerely,       Michael De Luna MD

## 2018-08-13 ENCOUNTER — OFFICE VISIT (OUTPATIENT)
Dept: CARDIOLOGY | Facility: CLINIC | Age: 53
End: 2018-08-13

## 2018-08-13 VITALS
BODY MASS INDEX: 36.84 KG/M2 | DIASTOLIC BLOOD PRESSURE: 102 MMHG | SYSTOLIC BLOOD PRESSURE: 140 MMHG | HEIGHT: 72 IN | WEIGHT: 272 LBS | HEART RATE: 86 BPM

## 2018-08-13 DIAGNOSIS — R94.31 ABNORMAL EKG: Primary | ICD-10-CM

## 2018-08-13 DIAGNOSIS — G47.33 OBSTRUCTIVE SLEEP APNEA SYNDROME: ICD-10-CM

## 2018-08-13 DIAGNOSIS — Z01.810 PREOP CARDIOVASCULAR EXAM: ICD-10-CM

## 2018-08-13 DIAGNOSIS — R03.0 ELEVATED BLOOD PRESSURE READING IN OFFICE WITHOUT DIAGNOSIS OF HYPERTENSION: ICD-10-CM

## 2018-08-13 PROCEDURE — 93000 ELECTROCARDIOGRAM COMPLETE: CPT | Performed by: INTERNAL MEDICINE

## 2018-08-13 PROCEDURE — 99244 OFF/OP CNSLTJ NEW/EST MOD 40: CPT | Performed by: INTERNAL MEDICINE

## 2018-08-13 RX ORDER — OMEPRAZOLE 40 MG/1
CAPSULE, DELAYED RELEASE ORAL
Refills: 1 | COMMUNITY
Start: 2018-08-02 | End: 2019-10-09

## 2018-08-13 RX ORDER — CEPHALEXIN 500 MG/1
500 CAPSULE ORAL 3 TIMES DAILY
COMMUNITY
End: 2018-08-21

## 2018-08-16 ENCOUNTER — DOCUMENTATION (OUTPATIENT)
Dept: CARDIOLOGY | Facility: CLINIC | Age: 53
End: 2018-08-16

## 2018-08-16 ENCOUNTER — OFFICE VISIT (OUTPATIENT)
Dept: SURGERY | Facility: CLINIC | Age: 53
End: 2018-08-16

## 2018-08-16 ENCOUNTER — HOSPITAL ENCOUNTER (OUTPATIENT)
Dept: CARDIOLOGY | Facility: HOSPITAL | Age: 53
Discharge: HOME | End: 2018-08-16
Attending: INTERNAL MEDICINE | Admitting: INTERNAL MEDICINE

## 2018-08-16 VITALS
HEART RATE: 66 BPM | WEIGHT: 276.4 LBS | BODY MASS INDEX: 37.49 KG/M2 | OXYGEN SATURATION: 97 % | SYSTOLIC BLOOD PRESSURE: 165 MMHG | DIASTOLIC BLOOD PRESSURE: 100 MMHG

## 2018-08-16 VITALS
SYSTOLIC BLOOD PRESSURE: 190 MMHG | DIASTOLIC BLOOD PRESSURE: 110 MMHG | BODY MASS INDEX: 36.84 KG/M2 | WEIGHT: 272 LBS | HEART RATE: 71 BPM | HEIGHT: 72 IN

## 2018-08-16 DIAGNOSIS — L72.3 INFECTED SEBACEOUS CYST: Primary | ICD-10-CM

## 2018-08-16 DIAGNOSIS — L08.9 INFECTED SEBACEOUS CYST: Primary | ICD-10-CM

## 2018-08-16 DIAGNOSIS — R03.0 ELEVATED BLOOD PRESSURE READING IN OFFICE WITHOUT DIAGNOSIS OF HYPERTENSION: ICD-10-CM

## 2018-08-16 DIAGNOSIS — R94.31 ABNORMAL EKG: ICD-10-CM

## 2018-08-16 DIAGNOSIS — G47.33 OBSTRUCTIVE SLEEP APNEA SYNDROME: ICD-10-CM

## 2018-08-16 PROCEDURE — 10060 I&D ABSCESS SIMPLE/SINGLE: CPT | Performed by: SURGERY

## 2018-08-16 PROCEDURE — 99212 OFFICE O/P EST SF 10 MIN: CPT | Performed by: SURGERY

## 2018-08-16 PROCEDURE — 93306 TTE W/DOPPLER COMPLETE: CPT | Performed by: INTERNAL MEDICINE

## 2018-08-16 PROCEDURE — 93306 TTE W/DOPPLER COMPLETE: CPT

## 2018-08-16 PROCEDURE — 25010000002 PERFLUTREN (DEFINITY) 8.476 MG IN SODIUM CHLORIDE 0.9 % 10 ML INJECTION: Performed by: INTERNAL MEDICINE

## 2018-08-16 RX ADMIN — PERFLUTREN 1.5 ML: 6.52 INJECTION, SUSPENSION INTRAVENOUS at 07:48

## 2018-08-16 NOTE — PROGRESS NOTES
Subjective   Orlando Friedman is a 53 y.o. male who presents to the office for an infected sebaceous cyst.    History of Present Illness     The patient was seen in the office on 7/31/2018 for 2 separate sebaceous cysts.  One was an infected sebaceous cyst with a low grade infection and was treated with antibiotics.  He was scheduled to have an excision of the sebaceous cysts but had an abnormal EKG that required a cardiac evaluation.  That evaluation was normal.  He now returns to the office because of increasing pain from the infected sebaceous cyst.  There has been no drainage.    Review of Systems   Constitutional: Negative for activity change, appetite change, fatigue and fever.   HENT: Negative for trouble swallowing and voice change.    Respiratory: Negative for chest tightness and shortness of breath.    Cardiovascular: Negative for chest pain and palpitations.   Gastrointestinal: Negative for abdominal pain, blood in stool, constipation, diarrhea, nausea and vomiting.   Endocrine: Negative for cold intolerance and heat intolerance.   Genitourinary: Negative for dysuria and flank pain.   Neurological: Negative for dizziness and light-headedness.   Hematological: Negative for adenopathy. Does not bruise/bleed easily.   Psychiatric/Behavioral: Negative for agitation and confusion.     Past Medical History:   Diagnosis Date   • Back pain    • Benign prostatic hypertrophy without urinary obstruction 3/30/2017   • Colitis    • Crohn's disease (CMS/HCC)    • Degenerative disc disease, lumbar 3/30/2017   • Hyperlipidemia 3/30/2017   • Impotence of organic origin 3/30/2017   • Sebaceous cyst    • Vitamin D deficiency 3/30/2017     Past Surgical History:   Procedure Laterality Date   • COLONOSCOPY  2013 approx    Crohns per patiient   • COLONOSCOPY N/A 5/31/2017    Procedure: COLONOSCOPY TO CECUM/TI WITH POLYPECTOMY (COLD BX) AND BIOPSY;  Surgeon: Erwin Herbert MD;  Location: [facility] ENDOSCOPY;  Service: [facility] [place]  mucosa in rectum, chronic active inflammation moderate to severe, acute cryptitis,    • CYST REMOVAL     • WISDOM TOOTH EXTRACTION       Family History   Problem Relation Age of Onset   • Breast cancer Sister    • Sudden death Father 68   • Malig Hyperthermia Neg Hx      Social History     Social History   • Marital status:      Spouse name: N/A   • Number of children: N/A   • Years of education: N/A     Occupational History   •       Social History Main Topics   • Smoking status: Former Smoker     Packs/day: 1.50     Types: Cigarettes     Quit date: 2009   • Smokeless tobacco: Never Used      Comment: CAFFEINE USE: ONE CUP DAILY.   • Alcohol use 6.0 oz/week     10 Cans of beer per week      Comment: WEEKENDS   • Drug use: No   • Sexual activity: Defer     Other Topics Concern   • Not on file     Social History Narrative   • No narrative on file       Objective   Physical Exam   Constitutional: He is oriented to person, place, and time. He appears well-developed and well-nourished.  Non-toxic appearance.   Eyes: EOM are normal. No scleral icterus.   Pulmonary/Chest: Effort normal. No respiratory distress.   Abdominal: Normal appearance.   Neurological: He is alert and oriented to person, place, and time.   Skin: Skin is warm and dry.   There is a 3 cm infected sebaceous cyst of the upper back with overlying cellulitis.  There is palpable fluctuance.   Psychiatric: He has a normal mood and affect. His behavior is normal. Judgment and thought content normal.     Procedure  The area of the infected sebaceous cyst was prepped and draped in the standard surgical fashion.  It was infiltrated with 1% lidocaine without epinephrine.  An incision was made and carried through the skin into the abscess cavity.  Purulent material was removed and the abscess was completely evacuated.  It was irrigated with the local anesthetic.  The abscess cavity was then packed with iodoform gauze and a dressing was applied.   The patient tolerated procedure well.    Assessment/Plan       The encounter diagnosis was Infected sebaceous cyst.    The patient has an infected sebaceous cyst.  An incision and drainage of the abscess was performed in the office.  Local wound care was discussed with him.  He will contact our office when he recovers from this procedure and is ready for an excision of all the sebaceous cysts.

## 2018-08-16 NOTE — PROGRESS NOTES
Date of Office Visit:  2018  Encounter Provider:  Michael De Luna MD  Place of Service:  [facility] CARDIOLOGY  Patient Name: Orlando Friedman  :  1965        Dear Lucia Harris and Camacho,    Mr. Friedman was noted to have nonspecific abnormalities on an EKG but had no worrisome cardiac symptoms, and he has a normal echocardiogram.    He is at low risk of major adverse cardiovascular events during GETA.    Please contact our office with any questions or concerns. As always, it has been a pleasure to participate in your patient's care.      Michael De Luna MD  [facility]

## 2018-08-17 LAB
ASCENDING AORTA: 3.2 CM
BH CV ECHO MEAS - ACS: 2.2 CM
BH CV ECHO MEAS - AO MAX PG (FULL): 2.8 MMHG
BH CV ECHO MEAS - AO MAX PG: 5.3 MMHG
BH CV ECHO MEAS - AO MEAN PG (FULL): 1.7 MMHG
BH CV ECHO MEAS - AO MEAN PG: 3.2 MMHG
BH CV ECHO MEAS - AO ROOT AREA (BSA CORRECTED): 1.3
BH CV ECHO MEAS - AO ROOT AREA: 7.9 CM^2
BH CV ECHO MEAS - AO ROOT DIAM: 3.2 CM
BH CV ECHO MEAS - AO V2 MAX: 114.8 CM/SEC
BH CV ECHO MEAS - AO V2 MEAN: 86.8 CM/SEC
BH CV ECHO MEAS - AO V2 VTI: 24.8 CM
BH CV ECHO MEAS - AVA(I,A): 3 CM^2
BH CV ECHO MEAS - AVA(I,D): 3 CM^2
BH CV ECHO MEAS - AVA(V,A): 2.6 CM^2
BH CV ECHO MEAS - AVA(V,D): 2.6 CM^2
BH CV ECHO MEAS - BSA(HAYCOCK): 2.6 M^2
BH CV ECHO MEAS - BSA: 2.4 M^2
BH CV ECHO MEAS - BZI_BMI: 37 KILOGRAMS/M^2
BH CV ECHO MEAS - BZI_METRIC_HEIGHT: 182.9 CM
BH CV ECHO MEAS - BZI_METRIC_WEIGHT: 123.8 KG
BH CV ECHO MEAS - CONTRAST EF (2CH): 55 CM2
BH CV ECHO MEAS - CONTRAST EF 4CH: 64 CM2
BH CV ECHO MEAS - EDV(TEICH): 110.9 ML
BH CV ECHO MEAS - EF(CUBED): 71.2 %
BH CV ECHO MEAS - EF(MOD-BP): 60 %
BH CV ECHO MEAS - EF(TEICH): 62.7 %
BH CV ECHO MEAS - ESV(TEICH): 41.4 ML
BH CV ECHO MEAS - FS: 33.9 %
BH CV ECHO MEAS - IVS/LVPW: 1.1
BH CV ECHO MEAS - IVSD: 1.4 CM
BH CV ECHO MEAS - LAT PEAK E' VEL: 10 CM/SEC
BH CV ECHO MEAS - LV MASS(C)D: 264 GRAMS
BH CV ECHO MEAS - LV MASS(C)DI: 108.6 GRAMS/M^2
BH CV ECHO MEAS - LV MAX PG: 2.5 MMHG
BH CV ECHO MEAS - LV MEAN PG: 1.5 MMHG
BH CV ECHO MEAS - LV V1 MAX: 79.1 CM/SEC
BH CV ECHO MEAS - LV V1 MEAN: 58.7 CM/SEC
BH CV ECHO MEAS - LV V1 VTI: 19.6 CM
BH CV ECHO MEAS - LVIDD: 4.9 CM
BH CV ECHO MEAS - LVIDS: 3.2 CM
BH CV ECHO MEAS - LVOT AREA (M): 3.8 CM^2
BH CV ECHO MEAS - LVOT AREA: 3.8 CM^2
BH CV ECHO MEAS - LVOT DIAM: 2.2 CM
BH CV ECHO MEAS - LVPWD: 1.3 CM
BH CV ECHO MEAS - MED PEAK E' VEL: 8 CM/SEC
BH CV ECHO MEAS - MR MAX PG: 25 MMHG
BH CV ECHO MEAS - MR MAX VEL: 250.2 CM/SEC
BH CV ECHO MEAS - MV A DUR: 0.12 SEC
BH CV ECHO MEAS - MV A MAX VEL: 69.4 CM/SEC
BH CV ECHO MEAS - MV DEC SLOPE: 307.6 CM/SEC^2
BH CV ECHO MEAS - MV DEC TIME: 0.27 SEC
BH CV ECHO MEAS - MV E MAX VEL: 81 CM/SEC
BH CV ECHO MEAS - MV E/A: 1.2
BH CV ECHO MEAS - MV MAX PG: 3.8 MMHG
BH CV ECHO MEAS - MV MEAN PG: 1.6 MMHG
BH CV ECHO MEAS - MV P1/2T MAX VEL: 79.1 CM/SEC
BH CV ECHO MEAS - MV P1/2T: 75.3 MSEC
BH CV ECHO MEAS - MV V2 MAX: 97.3 CM/SEC
BH CV ECHO MEAS - MV V2 MEAN: 59.4 CM/SEC
BH CV ECHO MEAS - MV V2 VTI: 29.1 CM
BH CV ECHO MEAS - MVA P1/2T LCG: 2.8 CM^2
BH CV ECHO MEAS - MVA(P1/2T): 2.9 CM^2
BH CV ECHO MEAS - MVA(VTI): 2.6 CM^2
BH CV ECHO MEAS - PA ACC TIME: 0.14 SEC
BH CV ECHO MEAS - PA MAX PG (FULL): 2.3 MMHG
BH CV ECHO MEAS - PA MAX PG: 3.5 MMHG
BH CV ECHO MEAS - PA PR(ACCEL): 15.6 MMHG
BH CV ECHO MEAS - PA V2 MAX: 93 CM/SEC
BH CV ECHO MEAS - PULM A REVS DUR: 0.11 SEC
BH CV ECHO MEAS - PULM A REVS VEL: 27.5 CM/SEC
BH CV ECHO MEAS - PULM DIAS VEL: 46.4 CM/SEC
BH CV ECHO MEAS - PULM S/D: 0.7
BH CV ECHO MEAS - PULM SYS VEL: 32.6 CM/SEC
BH CV ECHO MEAS - PVA(V,A): 1.9 CM^2
BH CV ECHO MEAS - PVA(V,D): 1.9 CM^2
BH CV ECHO MEAS - QP/QS: 0.47
BH CV ECHO MEAS - RAP SYSTOLE: 8 MMHG
BH CV ECHO MEAS - RV MAX PG: 1.2 MMHG
BH CV ECHO MEAS - RV MEAN PG: 0.56 MMHG
BH CV ECHO MEAS - RV V1 MAX: 53.8 CM/SEC
BH CV ECHO MEAS - RV V1 MEAN: 34.8 CM/SEC
BH CV ECHO MEAS - RV V1 VTI: 10.7 CM
BH CV ECHO MEAS - RVOT AREA: 3.3 CM^2
BH CV ECHO MEAS - RVOT DIAM: 2 CM
BH CV ECHO MEAS - RVSP: 8 MMHG
BH CV ECHO MEAS - SI(AO): 80.3 ML/M^2
BH CV ECHO MEAS - SI(CUBED): 33.7 ML/M^2
BH CV ECHO MEAS - SI(LVOT): 30.8 ML/M^2
BH CV ECHO MEAS - SI(TEICH): 28.6 ML/M^2
BH CV ECHO MEAS - SUP REN AO DIAM: 2.1 CM
BH CV ECHO MEAS - SV(AO): 195.3 ML
BH CV ECHO MEAS - SV(CUBED): 81.9 ML
BH CV ECHO MEAS - SV(LVOT): 74.8 ML
BH CV ECHO MEAS - SV(RVOT): 34.9 ML
BH CV ECHO MEAS - SV(TEICH): 69.5 ML
BH CV ECHO MEAS - TAPSE (>1.6): 1.7 CM2
BH CV ECHO MEASUREMENTS AVERAGE E/E' RATIO: 9
BH CV XLRA - RV BASE: 3.2 CM
BH CV XLRA - TDI S': 12 CM/SEC
LEFT ATRIUM VOLUME INDEX: 18 ML/M2
MAXIMAL PREDICTED HEART RATE: 169 BPM
SINUS: 3.6 CM
STJ: 2.8 CM
STRESS TARGET HR: 144 BPM

## 2018-08-21 ENCOUNTER — OFFICE VISIT (OUTPATIENT)
Dept: FAMILY MEDICINE CLINIC | Facility: CLINIC | Age: 53
End: 2018-08-21

## 2018-08-21 VITALS
DIASTOLIC BLOOD PRESSURE: 100 MMHG | HEIGHT: 72 IN | RESPIRATION RATE: 16 BRPM | SYSTOLIC BLOOD PRESSURE: 162 MMHG | OXYGEN SATURATION: 96 % | BODY MASS INDEX: 37.44 KG/M2 | TEMPERATURE: 98 F | HEART RATE: 92 BPM | WEIGHT: 276.4 LBS

## 2018-08-21 DIAGNOSIS — I10 ESSENTIAL HYPERTENSION: Primary | ICD-10-CM

## 2018-08-21 DIAGNOSIS — M51.36 DEGENERATIVE DISC DISEASE, LUMBAR: ICD-10-CM

## 2018-08-21 DIAGNOSIS — E66.09 EXOGENOUS OBESITY: ICD-10-CM

## 2018-08-21 DIAGNOSIS — G47.33 OSA (OBSTRUCTIVE SLEEP APNEA): ICD-10-CM

## 2018-08-21 PROBLEM — M54.9 BACK PAIN: Status: ACTIVE | Noted: 2018-08-21

## 2018-08-21 PROBLEM — M54.9 BACK PAIN: Status: RESOLVED | Noted: 2018-08-21 | Resolved: 2018-08-21

## 2018-08-21 PROCEDURE — 99214 OFFICE O/P EST MOD 30 MIN: CPT | Performed by: FAMILY MEDICINE

## 2018-08-21 RX ORDER — IBUPROFEN 800 MG/1
800 TABLET ORAL EVERY 8 HOURS PRN
Qty: 90 TABLET | Refills: 5 | Status: SHIPPED | OUTPATIENT
Start: 2018-08-21 | End: 2019-10-09 | Stop reason: SDUPTHER

## 2018-08-21 RX ORDER — HYDROCODONE BITARTRATE AND ACETAMINOPHEN 5; 325 MG/1; MG/1
1 TABLET ORAL EVERY 6 HOURS PRN
Qty: 40 TABLET | Refills: 0 | Status: CANCELLED | OUTPATIENT
Start: 2018-08-21

## 2018-08-21 RX ORDER — LISINOPRIL AND HYDROCHLOROTHIAZIDE 20; 12.5 MG/1; MG/1
1 TABLET ORAL DAILY
Qty: 30 TABLET | Refills: 1 | Status: SHIPPED | OUTPATIENT
Start: 2018-08-21 | End: 2018-09-06 | Stop reason: SDUPTHER

## 2018-08-21 NOTE — PATIENT INSTRUCTIONS
Return for fasting labs prior to next appointment.  Call if you develop a constant hacking cough which could be from the Lisinopril.

## 2018-08-21 NOTE — PROGRESS NOTES
Subjective   Orlando Friedman is a 53 y.o. male with   Chief Complaint   Patient presents with   • Med Refill     ibprofen, hydrocodone   • Back Pain   .    History of Present Illness     Pt is a 54 yo white male who presents to follow up on back pain.  He recently saw Dr De Luna of the cardiology service for abnormal EKG and unerwent  testing.  She wanted patient to follow up here in this office to handle his unstable HTN.  Elevated blood pressure has been present for quite some time with no treatment.  There have been no end organ symptoms.  Patient has long history of exogenous obesity.  Pt has been diagnosed with RICH and he picked up his CPAP today.  This however is a new diagnosis and he has not used his CPAP machine to date. Pt does not smoke.  Pt states its his low back that causes him pain, if he stands for extended periods of time.  There are no radicular symptoms.  Patient with long history of degenerative disc disease.  X-rays were obtained quite some time ago and patient has been to physical therapy.  He has no formal exercise program and does not do low back exercises at home.  He has been prescribed Hydrocodone 5/325 mg in the past and he takes it rarely.  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  Pt has no further questions or complaints at this time.    The following portions of the patient's history were reviewed and updated as appropriate: allergies, current medications, past family history, past medical history, past social history, past surgical history and problem list.    Review of Systems   Constitutional:        Exogenous obesity   Respiratory: Positive for apnea (RICH).    Cardiovascular: Positive for leg swelling (slight). Negative for chest pain and palpitations.        HTN   Musculoskeletal: Positive for back pain (low back).   All other systems reviewed and are negative.      Objective      Vitals:    08/21/18 1501   BP: 162/100   Pulse: 92   Resp: 16   Temp: 98 °F (36.7 °C)   SpO2: 96%     BP Readings from Last 3 Encounters:   08/21/18 162/100   08/16/18 (!) 190/110   08/16/18 165/100      Wt Readings from Last 3 Encounters:   08/21/18 125 kg (276 lb 6.4 oz)   08/16/18 123 kg (272 lb)   08/16/18 125 kg (276 lb 6.4 oz)        Physical Exam   Constitutional: He is oriented to person, place, and time. He appears well-developed and well-nourished.   Obese   HENT:   Head: Normocephalic and atraumatic.   Neck: Trachea normal and phonation normal. Neck supple. Normal carotid pulses present. Carotid bruit is not present. No thyroid mass and no thyromegaly present.   Cardiovascular: Normal rate, regular rhythm and normal heart sounds.  Exam reveals no gallop and no friction rub.    No murmur heard.  Trace peripheral pitting edema distally.   Pulmonary/Chest: Effort normal and breath sounds normal. No respiratory distress. He has no decreased breath sounds. He has no wheezes. He has no rhonchi. He has no rales.   Lymphadenopathy:     He has no cervical adenopathy.   Neurological: He is alert and oriented to person, place, and time.   Skin: Skin is warm and dry. No rash noted.   Psychiatric: He has a normal mood and affect. His speech is normal and behavior is normal. Judgment and thought content normal. Cognition and memory are normal.   Nursing note and vitals reviewed.      Assessment/Plan   Orlando was seen today for med refill and back pain.    Diagnoses and all orders for this visit:    Essential hypertension  -     lisinopril-hydrochlorothiazide (PRINZIDE,ZESTORETIC) 20-12.5 MG per tablet; Take 1 tablet by mouth Daily.    Degenerative disc disease, lumbar  -     ibuprofen (ADVIL,MOTRIN) 800 MG tablet; Take 1 tablet by mouth Every 8 (Eight) Hours As Needed for Mild Pain .  -     Ambulatory Referral to Physical Therapy Evaluate and treat    RICH (obstructive sleep apnea)    Exogenous obesity    Other  orders  -     Cancel: HYDROcodone-acetaminophen (NORCO) 5-325 MG per tablet; Take 1 tablet by mouth Every 6 (Six) Hours As Needed for Moderate Pain .      Patient Instructions   Return for fasting labs prior to next appointment.  Call if you develop a constant hacking cough which could be from the Lisinopril.      Return in about 1 month (around 9/21/2018).    Scribed for Josue Harris MD by Terri Jacob CMA. 08/21/2018    I, Josue Harris MD personally performed the services described in this documentation, as scribed by Terri Jacob CMA in my presence, and it is both accurate and complete

## 2018-08-23 DIAGNOSIS — M51.36 DEGENERATIVE DISC DISEASE, LUMBAR: ICD-10-CM

## 2018-08-23 PROBLEM — I10 ESSENTIAL HYPERTENSION: Status: ACTIVE | Noted: 2018-08-23

## 2018-08-23 PROBLEM — G47.33 OSA (OBSTRUCTIVE SLEEP APNEA): Status: ACTIVE | Noted: 2018-08-23

## 2018-08-23 RX ORDER — HYDROCODONE BITARTRATE AND ACETAMINOPHEN 5; 325 MG/1; MG/1
1 TABLET ORAL EVERY 6 HOURS PRN
Qty: 40 TABLET | Refills: 0 | Status: SHIPPED | OUTPATIENT
Start: 2018-08-23 | End: 2019-10-09 | Stop reason: SDUPTHER

## 2018-08-30 ENCOUNTER — TELEPHONE (OUTPATIENT)
Dept: FAMILY MEDICINE CLINIC | Facility: CLINIC | Age: 53
End: 2018-08-30

## 2018-08-30 RX ORDER — VALACYCLOVIR HYDROCHLORIDE 1 G/1
TABLET, FILM COATED ORAL
Qty: 20 TABLET | Refills: 0 | Status: SHIPPED | OUTPATIENT
Start: 2018-08-30 | End: 2021-01-04

## 2018-09-06 DIAGNOSIS — I10 ESSENTIAL HYPERTENSION: ICD-10-CM

## 2018-09-06 RX ORDER — LISINOPRIL AND HYDROCHLOROTHIAZIDE 20; 12.5 MG/1; MG/1
1 TABLET ORAL DAILY
Qty: 90 TABLET | Refills: 1 | Status: SHIPPED | OUTPATIENT
Start: 2018-09-06 | End: 2018-09-25 | Stop reason: SDUPTHER

## 2018-09-25 ENCOUNTER — TELEPHONE (OUTPATIENT)
Dept: FAMILY MEDICINE CLINIC | Facility: CLINIC | Age: 53
End: 2018-09-25

## 2018-09-25 DIAGNOSIS — I10 ESSENTIAL HYPERTENSION: ICD-10-CM

## 2018-09-25 RX ORDER — LISINOPRIL AND HYDROCHLOROTHIAZIDE 20; 12.5 MG/1; MG/1
1 TABLET ORAL DAILY
Qty: 90 TABLET | Refills: 1 | Status: SHIPPED | OUTPATIENT
Start: 2018-09-25 | End: 2018-09-26 | Stop reason: SDUPTHER

## 2018-09-26 DIAGNOSIS — I10 ESSENTIAL HYPERTENSION: ICD-10-CM

## 2018-09-26 RX ORDER — LISINOPRIL AND HYDROCHLOROTHIAZIDE 20; 12.5 MG/1; MG/1
1 TABLET ORAL DAILY
Qty: 90 TABLET | Refills: 1 | Status: SHIPPED | OUTPATIENT
Start: 2018-09-26 | End: 2019-10-09 | Stop reason: SDUPTHER

## 2019-06-18 ENCOUNTER — OFFICE VISIT (OUTPATIENT)
Dept: GASTROENTEROLOGY | Facility: CLINIC | Age: 54
End: 2019-06-18

## 2019-06-18 VITALS
TEMPERATURE: 98.3 F | WEIGHT: 252.6 LBS | SYSTOLIC BLOOD PRESSURE: 124 MMHG | DIASTOLIC BLOOD PRESSURE: 84 MMHG | HEIGHT: 72 IN | BODY MASS INDEX: 34.21 KG/M2

## 2019-06-18 DIAGNOSIS — K50.111 CROHN'S DISEASE OF LARGE INTESTINE WITH RECTAL BLEEDING (HCC): Primary | ICD-10-CM

## 2019-06-18 LAB
ALBUMIN SERPL-MCNC: 4.6 G/DL (ref 3.5–5.2)
ALBUMIN/GLOB SERPL: 1.8 G/DL
ALP SERPL-CCNC: 78 U/L (ref 39–117)
ALT SERPL-CCNC: 21 U/L (ref 1–41)
AST SERPL-CCNC: 16 U/L (ref 1–40)
BILIRUB SERPL-MCNC: 0.6 MG/DL (ref 0.2–1.2)
BUN SERPL-MCNC: 16 MG/DL (ref 6–20)
BUN/CREAT SERPL: 18 (ref 7–25)
CALCIUM SERPL-MCNC: 9.5 MG/DL (ref 8.6–10.5)
CHLORIDE SERPL-SCNC: 100 MMOL/L (ref 98–107)
CO2 SERPL-SCNC: 29.5 MMOL/L (ref 22–29)
CREAT SERPL-MCNC: 0.89 MG/DL (ref 0.76–1.27)
CRP SERPL-MCNC: 0.49 MG/DL (ref 0–0.5)
ERYTHROCYTE [DISTWIDTH] IN BLOOD BY AUTOMATED COUNT: 13.3 % (ref 12.3–15.4)
ERYTHROCYTE [SEDIMENTATION RATE] IN BLOOD BY WESTERGREN METHOD: 11 MM/HR (ref 0–20)
GLOBULIN SER CALC-MCNC: 2.6 GM/DL
GLUCOSE SERPL-MCNC: 104 MG/DL (ref 65–99)
HCT VFR BLD AUTO: 44.1 % (ref 37.5–51)
HGB BLD-MCNC: 13.9 G/DL (ref 13–17.7)
IRON SATN MFR SERPL: 21 % (ref 20–50)
IRON SERPL-MCNC: 88 MCG/DL (ref 59–158)
MCH RBC QN AUTO: 29 PG (ref 26.6–33)
MCHC RBC AUTO-ENTMCNC: 31.5 G/DL (ref 31.5–35.7)
MCV RBC AUTO: 91.9 FL (ref 79–97)
PLATELET # BLD AUTO: 258 10*3/MM3 (ref 140–450)
POTASSIUM SERPL-SCNC: 4.7 MMOL/L (ref 3.5–5.2)
PROT SERPL-MCNC: 7.2 G/DL (ref 6–8.5)
RBC # BLD AUTO: 4.8 10*6/MM3 (ref 4.14–5.8)
SODIUM SERPL-SCNC: 140 MMOL/L (ref 136–145)
TIBC SERPL-MCNC: 429 MCG/DL
UIBC SERPL-MCNC: 341 MCG/DL (ref 112–346)
WBC # BLD AUTO: 5.05 10*3/MM3 (ref 3.4–10.8)

## 2019-06-18 PROCEDURE — 99213 OFFICE O/P EST LOW 20 MIN: CPT | Performed by: INTERNAL MEDICINE

## 2019-06-18 RX ORDER — BUDESONIDE 9 MG/1
1 TABLET, FILM COATED, EXTENDED RELEASE ORAL DAILY
Qty: 30 TABLET | Refills: 11 | Status: SHIPPED | OUTPATIENT
Start: 2019-06-18 | End: 2020-07-30 | Stop reason: SDUPTHER

## 2019-06-18 RX ORDER — MESALAMINE 0.38 G/1
1500 CAPSULE, EXTENDED RELEASE ORAL DAILY
Qty: 120 CAPSULE | Refills: 11 | Status: SHIPPED | OUTPATIENT
Start: 2019-06-18 | End: 2019-10-09 | Stop reason: ALTCHOICE

## 2019-06-18 NOTE — PROGRESS NOTES
Chief Complaint   Patient presents with   • Rectal Bleeding   • Diarrhea       Orlando Friedman is a  54 y.o. male here for a follow up visit for rectal bleeding and diarrhea.    HPI     Patient 54-year-old male with history of Crohn's colitis as well as hyperlipidemia presenting for 6 months of increased symptoms.  Patient with diarrhea and rectal bleeding for over 6 months.  Patient reports symptoms have somewhat progressed to slowly.  Patient denies nausea and vomiting but concerned about what he can eat.  Patient reports about anything causes a stomach upset.  Patient reports trying to eat healthier but not succeeding very well due to his symptoms.    Past Medical History:   Diagnosis Date   • Back pain    • Benign prostatic hypertrophy without urinary obstruction 3/30/2017   • Colitis    • Crohn's disease (CMS/HCC)    • Degenerative disc disease, lumbar 3/30/2017   • Hyperlipidemia 3/30/2017   • Impotence of organic origin 3/30/2017   • Sebaceous cyst    • Vitamin D deficiency 3/30/2017         Current Outpatient Medications:   •  HYDROcodone-acetaminophen (NORCO) 5-325 MG per tablet, Take 1 tablet by mouth Every 6 (Six) Hours As Needed for Moderate Pain ., Disp: 40 tablet, Rfl: 0  •  ibuprofen (ADVIL,MOTRIN) 800 MG tablet, Take 1 tablet by mouth Every 8 (Eight) Hours As Needed for Mild Pain ., Disp: 90 tablet, Rfl: 5  •  lisinopril-hydrochlorothiazide (PRINZIDE,ZESTORETIC) 20-12.5 MG per tablet, Take 1 tablet by mouth Daily., Disp: 90 tablet, Rfl: 1  •  Probiotic Product (PROBIOTIC PO), Take  by mouth., Disp: , Rfl:   •  Budesonide ER (UCERIS) 9 MG tablet sustained-release 24 hour, Take 1 tablet by mouth Daily., Disp: 30 tablet, Rfl: 11  •  mesalamine (APRISO) 0.375 g 24 hr capsule, Take 4 capsules by mouth Daily., Disp: 120 capsule, Rfl: 11  •  omeprazole (priLOSEC) 40 MG capsule, TAKE 1 CAPSULE BY MOUTH EVERY DAY BEFORE A MEAL, Disp: , Rfl: 1  •  valACYclovir (VALTREX) 1000 MG tablet, 2 po BID x 1 day with  outbreak, Disp: 20 tablet, Rfl: 0    No Known Allergies    Social History     Socioeconomic History   • Marital status:      Spouse name: Not on file   • Number of children: Not on file   • Years of education: Not on file   • Highest education level: Not on file   Occupational History   • Occupation:    Tobacco Use   • Smoking status: Former Smoker     Packs/day: 1.50     Types: Cigarettes     Last attempt to quit: 2009     Years since quitting: 10.4   • Smokeless tobacco: Never Used   • Tobacco comment: CAFFEINE USE: ONE CUP DAILY.   Substance and Sexual Activity   • Alcohol use: Yes     Alcohol/week: 6.0 oz     Types: 10 Cans of beer per week   • Drug use: No   • Sexual activity: Defer       Family History   Problem Relation Age of Onset   • Breast cancer Sister    • Sudden death Father 68   • Malig Hyperthermia Neg Hx        Review of Systems   Constitutional: Negative.    Respiratory: Negative.    Cardiovascular: Negative.    Gastrointestinal: Positive for abdominal pain, blood in stool and diarrhea. Negative for abdominal distention, constipation and rectal pain.   Musculoskeletal: Negative.    Hematological: Negative.        Vitals:    06/18/19 0937   BP: 124/84   Temp: 98.3 °F (36.8 °C)       Physical Exam   Constitutional: He is oriented to person, place, and time. He appears well-developed and well-nourished.   HENT:   Head: Normocephalic and atraumatic.   Eyes: Pupils are equal, round, and reactive to light. No scleral icterus.   Cardiovascular: Normal rate and regular rhythm.   Pulmonary/Chest: Effort normal.   Abdominal: Soft. Bowel sounds are normal. He exhibits no distension and no mass. There is no tenderness. No hernia.   Neurological: He is alert and oriented to person, place, and time.   Skin: Skin is warm and dry. No rash noted.   Psychiatric: He has a normal mood and affect. His behavior is normal.   Vitals reviewed.      No visits with results within 2 Month(s) from this visit.    Latest known visit with results is:   Hospital Outpatient Visit on 08/16/2018   Component Date Value Ref Range Status   • BSA 08/16/2018 2.4  m^2 Final   • IVSd 08/16/2018 1.4  cm Final   • LVIDd 08/16/2018 4.9  cm Final   • LVIDs 08/16/2018 3.2  cm Final   • LVPWd 08/16/2018 1.3  cm Final   • IVS/LVPW 08/16/2018 1.1   Final   • FS 08/16/2018 33.9  % Final   • EDV(Teich) 08/16/2018 110.9  ml Final   • ESV(Teich) 08/16/2018 41.4  ml Final   • EF(Teich) 08/16/2018 62.7  % Final   • EF(cubed) 08/16/2018 71.2  % Final   • LV mass(C)d 08/16/2018 264.0  grams Final   • LV mass(C)dI 08/16/2018 108.6  grams/m^2 Final   • SV(Teich) 08/16/2018 69.5  ml Final   • SI(Teich) 08/16/2018 28.6  ml/m^2 Final   • SV(cubed) 08/16/2018 81.9  ml Final   • SI(cubed) 08/16/2018 33.7  ml/m^2 Final   • Ao root diam 08/16/2018 3.2  cm Final   • Ao root area 08/16/2018 7.9  cm^2 Final   • ACS 08/16/2018 2.2  cm Final   • LVOT diam 08/16/2018 2.2  cm Final   • LVOT area 08/16/2018 3.8  cm^2 Final   • LVOT area(traced) 08/16/2018 3.8  cm^2 Final   • RVOT diam 08/16/2018 2.0  cm Final   • RVOT area 08/16/2018 3.3  cm^2 Final   • Ao root area (BSA corrected) 08/16/2018 1.3   Final   • MV A dur 08/16/2018 0.12  sec Final   • MV E max elijah 08/16/2018 81.0  cm/sec Final   • MV A max elijah 08/16/2018 69.4  cm/sec Final   • MV E/A 08/16/2018 1.2   Final   • MV V2 max 08/16/2018 97.3  cm/sec Final   • MV max PG 08/16/2018 3.8  mmHg Final   • MV V2 mean 08/16/2018 59.4  cm/sec Final   • MV mean PG 08/16/2018 1.6  mmHg Final   • MV V2 VTI 08/16/2018 29.1  cm Final   • MVA(VTI) 08/16/2018 2.6  cm^2 Final   • MV P1/2t max elijah 08/16/2018 79.1  cm/sec Final   • MV P1/2t 08/16/2018 75.3  msec Final   • MVA(P1/2t) 08/16/2018 2.9  cm^2 Final   • MV dec slope 08/16/2018 307.6  cm/sec^2 Final   • MV dec time 08/16/2018 0.27  sec Final   • Ao pk elijah 08/16/2018 114.8  cm/sec Final   • Ao max PG 08/16/2018 5.3  mmHg Final   • Ao max PG (full) 08/16/2018 2.8  mmHg  Final   • Ao V2 mean 08/16/2018 86.8  cm/sec Final   • Ao mean PG 08/16/2018 3.2  mmHg Final   • Ao mean PG (full) 08/16/2018 1.7  mmHg Final   • Ao V2 VTI 08/16/2018 24.8  cm Final   • CATRINA(I,A) 08/16/2018 3.0  cm^2 Final   • CATRINA(I,D) 08/16/2018 3.0  cm^2 Final   • CATRINA(V,A) 08/16/2018 2.6  cm^2 Final   • CATRINA(V,D) 08/16/2018 2.6  cm^2 Final   • LV V1 max PG 08/16/2018 2.5  mmHg Final   • LV V1 mean PG 08/16/2018 1.5  mmHg Final   • LV V1 max 08/16/2018 79.1  cm/sec Final   • LV V1 mean 08/16/2018 58.7  cm/sec Final   • LV V1 VTI 08/16/2018 19.6  cm Final   • MR max too 08/16/2018 250.2  cm/sec Final   • MR max PG 08/16/2018 25.0  mmHg Final   • SV(Ao) 08/16/2018 195.3  ml Final   • SI(Ao) 08/16/2018 80.3  ml/m^2 Final   • SV(LVOT) 08/16/2018 74.8  ml Final   • SV(RVOT) 08/16/2018 34.9  ml Final   • SI(LVOT) 08/16/2018 30.8  ml/m^2 Final   • PA V2 max 08/16/2018 93.0  cm/sec Final   • PA max PG 08/16/2018 3.5  mmHg Final   • PA max PG (full) 08/16/2018 2.3  mmHg Final   • BH CV ECHO SAFIA - PVA(V,A) 08/16/2018 1.9  cm^2 Final   • BH CV ECHO SAFIA - PVA(V,D) 08/16/2018 1.9  cm^2 Final   • PA acc time 08/16/2018 0.14  sec Final   • RV V1 max PG 08/16/2018 1.2  mmHg Final   • RV V1 mean PG 08/16/2018 0.56  mmHg Final   • RV V1 max 08/16/2018 53.8  cm/sec Final   • RV V1 mean 08/16/2018 34.8  cm/sec Final   • RV V1 VTI 08/16/2018 10.7  cm Final   • PA pr(Accel) 08/16/2018 15.6  mmHg Final   • Pulm Sys Too 08/16/2018 32.6  cm/sec Final   • Pulm Etienne Too 08/16/2018 46.4  cm/sec Final   • Pulm S/D 08/16/2018 0.7   Final   • Qp/Qs 08/16/2018 0.47   Final   • Pulm A Revs Dur 08/16/2018 0.11  sec Final   • Pulm A Revs Too 08/16/2018 27.5  cm/sec Final   • MVA P1/2T LCG 08/16/2018 2.8  cm^2 Final   • BH CV ECHO SAFIA - BZI_BMI 08/16/2018 37.0  kilograms/m^2 Final   • BH CV ECHO SAFIA - BSA(HAYCOCK) 08/16/2018 2.6  m^2 Final   • BH CV ECHO SAFIA - BZI_METRIC_WEIGHT 08/16/2018 123.8  kg Final   • BH CV ECHO SAFIA - BZI_METRIC_HEIGHT  08/16/2018 182.9  cm Final   • TDI S' 08/16/2018 12.00  cm/sec Final   • RV Base 08/16/2018 3.20  cm Final   • Sinus 08/16/2018 3.60  cm Final   • STJ 08/16/2018 2.80  cm Final   • Ascending aorta 08/16/2018 3.20  cm Final   • LA Volume Index 08/16/2018 18.0  mL/m2 Final   • Avg E/e' ratio 08/16/2018 9.00   Final   • EF(MOD-bp) 08/16/2018 60  % Final   • Lat Peak E' Too 08/16/2018 10.0  cm/sec Final   • Med Peak E' Too 08/16/2018 8.00  cm/sec Final   • RAP systole 08/16/2018 8  mmHg Final   • RVSP(TR) 08/16/2018 8  mmHg Final   • Abdo Ao Diam 08/16/2018 2.10  cm Final   • EF - Contrast (2Ch) 08/16/2018 55.0  cm2 Final   • EF - Contrast (4Ch) 08/16/2018 64.0  cm2 Final   • TAPSE (>1.6) 08/16/2018 1.70  cm2 Final   • Target HR (85%) 08/16/2018 144  bpm Final   • Max. Pred. HR (100%) 08/16/2018 169  bpm Final       Orlando was seen today for rectal bleeding and diarrhea.    Diagnoses and all orders for this visit:    Crohn's disease of large intestine with rectal bleeding (CMS/HCC)  -     CBC (No Diff)  -     Comprehensive Metabolic Panel  -     Iron Profile  -     C-reactive Protein  -     Sedimentation Rate    Other orders  -     mesalamine (APRISO) 0.375 g 24 hr capsule; Take 4 capsules by mouth Daily.  -     Budesonide ER (UCERIS) 9 MG tablet sustained-release 24 hour; Take 1 tablet by mouth Daily.      Patient 54-year-old male with history of Crohn's colitis presents in follow-up.  Patient reports 6 months of diarrhea and rectal bleeding here for follow-up.  Patient instructed next time to call sooner and not wait so long so we can improve his issues more quickly.  Explained that as time goes on with the disease the injury becomes more extensive than more healing required to reverse it.  For now we will begin a pre-so as Lialda caused more diarrhea.  We will also add Uceris to the regimen until symptoms improve.  Will follow clinically for response.  We will check his labs today for signs of iron deficiency and  supplement as needed.

## 2019-07-09 ENCOUNTER — TELEPHONE (OUTPATIENT)
Dept: GASTROENTEROLOGY | Facility: CLINIC | Age: 54
End: 2019-07-09

## 2019-07-09 NOTE — TELEPHONE ENCOUNTER
Patient called, no answer, left message(VM identified patient) advising as per Dr. Herbert's note. Advised to call back for questions/concerns.

## 2019-07-09 NOTE — TELEPHONE ENCOUNTER
----- Message from Erwin Herbert MD sent at 7/4/2019 12:04 PM EDT -----  Labs all normal, no evidence of active colitis or anemia.  Continue current meds and call if symptoms not improved.

## 2019-07-17 ENCOUNTER — TELEPHONE (OUTPATIENT)
Dept: GASTROENTEROLOGY | Facility: CLINIC | Age: 54
End: 2019-07-17

## 2019-07-17 DIAGNOSIS — K51.911 ULCERATIVE COLITIS WITH RECTAL BLEEDING, UNSPECIFIED LOCATION (HCC): Primary | ICD-10-CM

## 2019-07-17 NOTE — TELEPHONE ENCOUNTER
Returned patient's phone call. He states he has been taking the Apriso 1500mg and Budesonide 9 mg every day since his last visit with Dr. Herbert(6/19/19). Reports an improvement with abdominal cramping but continues with loose stool(close to watery consistency) with bright red blood mixed in the stool. He reports fecal urgency but not incontinence. Questions if there is another medication to take or add to his regimen. Advised will send an update to HAY Pizano. He verb understanding.

## 2019-07-17 NOTE — TELEPHONE ENCOUNTER
Lets have him complete GI PCR and C. difficile stool testing.  Looks like he had been having symptoms for about 6 months before he was started on medication.  Has he been on prednisone in the past?  Thanks BG

## 2019-07-17 NOTE — TELEPHONE ENCOUNTER
----- Message from Sean Egan sent at 7/17/2019 11:02 AM EDT -----  Regarding: APRISO- BUDESONIDE  Contact: [phone]  MEDICATION IS SOMEWHAT WORKING. STILL HAVING LOOSE STOOL AND SOME BLOOD..

## 2019-07-17 NOTE — TELEPHONE ENCOUNTER
Returned patient's phone call. Advised as per HAY Pizano's note. When asked if he has been on Prednisone in the past, he states yes but its been a long time ago. He verb understanding of the note and is in agreement with the plan. Stool kit placed at  for patient to  at his convenience.

## 2019-07-18 ENCOUNTER — RESULTS ENCOUNTER (OUTPATIENT)
Dept: GASTROENTEROLOGY | Facility: CLINIC | Age: 54
End: 2019-07-18

## 2019-07-18 DIAGNOSIS — K51.911 ULCERATIVE COLITIS WITH RECTAL BLEEDING, UNSPECIFIED LOCATION (HCC): ICD-10-CM

## 2019-07-18 NOTE — TELEPHONE ENCOUNTER
Patient called, no answer, left message advising patient to call the office back to schedule a f/u with Jerica on Monday 7/22.

## 2019-08-04 LAB
ADV 40+41 DNA STL QL NAA+NON-PROBE: NOT DETECTED
ASTRO TYP 1-8 RNA STL QL NAA+NON-PROBE: NOT DETECTED
C CAYETANENSIS DNA STL QL NAA+NON-PROBE: NOT DETECTED
C COLI+JEJ+UPSA DNA STL QL NAA+NON-PROBE: NOT DETECTED
C DIF TOX TCDA+TCDB STL QL NAA+NON-PROBE: NOT DETECTED
CRYPTOSP DNA STL QL NAA+NON-PROBE: NOT DETECTED
E COLI O157 DNA STL QL NAA+NON-PROBE: NORMAL
E HISTOLYT DNA STL QL NAA+NON-PROBE: NOT DETECTED
EAEC PAA PLAS AGGR+AATA ST NAA+NON-PRB: NOT DETECTED
EC STX1+STX2 GENES STL QL NAA+NON-PROBE: NOT DETECTED
EPEC EAE GENE STL QL NAA+NON-PROBE: NOT DETECTED
ETEC LTA+ST1A+ST1B TOX ST NAA+NON-PROBE: NOT DETECTED
G LAMBLIA DNA STL QL NAA+NON-PROBE: NOT DETECTED
NOROVIRUS GI+II RNA STL QL NAA+NON-PROBE: NOT DETECTED
P SHIGELLOIDES DNA STL QL NAA+NON-PROBE: NOT DETECTED
RVA RNA STL QL NAA+NON-PROBE: NOT DETECTED
S ENT+BONG DNA STL QL NAA+NON-PROBE: NOT DETECTED
SAPO I+II+IV+V RNA STL QL NAA+NON-PROBE: NOT DETECTED
SHIGELLA SP+EIEC IPAH ST NAA+NON-PROBE: NOT DETECTED
V CHOL+PARA+VUL DNA STL QL NAA+NON-PROBE: NOT DETECTED
V CHOLERAE DNA STL QL NAA+NON-PROBE: NOT DETECTED
Y ENTEROCOL DNA STL QL NAA+NON-PROBE: NOT DETECTED

## 2019-08-05 LAB — SPECIMEN STATUS: NORMAL

## 2019-08-06 ENCOUNTER — TELEPHONE (OUTPATIENT)
Dept: GASTROENTEROLOGY | Facility: CLINIC | Age: 54
End: 2019-08-06

## 2019-08-06 DIAGNOSIS — R19.7 DIARRHEA, UNSPECIFIED TYPE: Primary | ICD-10-CM

## 2019-08-06 NOTE — TELEPHONE ENCOUNTER
Patient called, spoke with spouse, advised as per Dr. Herbert's note. She verb understanding and is in agreement with Dr. Herbert's plan and will relay message to the patient.

## 2019-08-06 NOTE — TELEPHONE ENCOUNTER
Stools negative for infectious etiology.  Recommend starting Imodium 2 tablets with first diarrhea stool then 1 for each diarrhea up to 8 tablets a day.  Also recommend arrange CT abdomen and pelvis with contrast other issue that may not have significant inflammatory component may be a fistula which can occur in Crohn's that can bypass sections of the colon causing persistent diarrhea with little inflammatory changes.

## 2019-08-06 NOTE — TELEPHONE ENCOUNTER
----- Message from Sean Egan sent at 8/6/2019 11:51 AM EDT -----  Regarding: LABS   Contact: [phone]  CALLING FOR REPORT

## 2019-08-06 NOTE — TELEPHONE ENCOUNTER
Returned patient's phone call. Advised patient Dr. Herbert had not signed off on his results but his stool studies were negative. Patient frustrated and wanted to know what to do next? He states he continues with diarrhea and bloody stools. Advised to schedule a f/u appointment with Dr. Herbert. Patient handed the phone to spouse who states they have a high deductible and did not want to come in for an office visit which will be an oop cost of $150 to be told to start a medication. Advised this RN was sending an update to Dr. Herbert regarding patient's symptoms and the f/u office visit could be cancelled if it was not needed. Spouse became frustrated, argumentative and hung up the phone.    Verbal update given to Dr. Herbert regarding the patient's condition and he states he will review the patient's chart and decide what the next step will be for the patient.

## 2019-08-14 ENCOUNTER — HOSPITAL ENCOUNTER (OUTPATIENT)
Dept: CT IMAGING | Facility: HOSPITAL | Age: 54
Discharge: HOME | End: 2019-08-14
Admitting: INTERNAL MEDICINE

## 2019-08-14 DIAGNOSIS — R19.7 DIARRHEA, UNSPECIFIED TYPE: ICD-10-CM

## 2019-08-14 PROCEDURE — 82565 ASSAY OF CREATININE: CPT

## 2019-08-14 PROCEDURE — 25010000002 IOPAMIDOL 61 % SOLUTION: Performed by: INTERNAL MEDICINE

## 2019-08-14 PROCEDURE — 74177 CT ABD & PELVIS W/CONTRAST: CPT

## 2019-08-14 PROCEDURE — 0 DIATRIZOATE MEGLUMINE & SODIUM PER 1 ML: Performed by: INTERNAL MEDICINE

## 2019-08-14 RX ADMIN — DIATRIZOATE MEGLUMINE AND DIATRIZOATE SODIUM 30 ML: 660; 100 LIQUID ORAL; RECTAL at 10:45

## 2019-08-14 RX ADMIN — IOPAMIDOL 95 ML: 612 INJECTION, SOLUTION INTRAVENOUS at 12:00

## 2019-08-15 LAB — CREAT BLDA-MCNC: 0.8 MG/DL (ref 0.6–1.3)

## 2019-08-19 ENCOUNTER — TELEPHONE (OUTPATIENT)
Dept: GASTROENTEROLOGY | Facility: CLINIC | Age: 54
End: 2019-08-19

## 2019-08-19 DIAGNOSIS — K50.911 CROHN'S DISEASE WITH RECTAL BLEEDING, UNSPECIFIED GASTROINTESTINAL TRACT LOCATION (HCC): Primary | ICD-10-CM

## 2019-08-19 NOTE — TELEPHONE ENCOUNTER
----- Message from Erwin Herbert MD sent at 8/19/2019 11:23 AM EDT -----  Positive for active crohn's, arrange to begin Humira

## 2019-08-19 NOTE — TELEPHONE ENCOUNTER
Received e-mail from patient's spouse stating the patient is still having multiple bloody stools.   Patient's spouse called(listed on [facility]). Advised as per Dr. Herbert's note. She verb understanding and states patient is in the background and is agreeable to the plan. Update sent to VLADIMIR El to start PA for Humira.

## 2019-08-21 ENCOUNTER — TELEPHONE (OUTPATIENT)
Dept: GASTROENTEROLOGY | Facility: CLINIC | Age: 54
End: 2019-08-21

## 2019-08-21 NOTE — TELEPHONE ENCOUNTER
Returned patient's phone call. Spoke with spouse, questions regarding how long it will take to get the medication, when will TB blood work result? Questions answered. When asked if the patient is having worsening of symptoms, she states no. She verb understanding.

## 2019-08-21 NOTE — TELEPHONE ENCOUNTER
----- Message from Sean Egan sent at 8/20/2019  2:32 PM EDT -----  Regarding: PAIN   Contact: [phone]  PT WAS JUST IN THE OFFICE FOR LABS AND STATED NOT FEELING WELL.

## 2019-08-22 LAB
GAMMA INTERFERON BACKGROUND BLD IA-ACNC: 0.04 IU/ML
M TB IFN-G BLD-IMP: NEGATIVE
M TB IFN-G CD4+ BCKGRND COR BLD-ACNC: 0.05 IU/ML
MITOGEN IGNF BLD-ACNC: >10 IU/ML
QUANTIFERON INCUBATION: NORMAL
QUANTIFERON TB2 AG VALUE: 0.04 IU/ML
SERVICE CMNT-IMP: NORMAL

## 2019-08-23 ENCOUNTER — PRIOR AUTHORIZATION (OUTPATIENT)
Dept: GASTROENTEROLOGY | Facility: CLINIC | Age: 54
End: 2019-08-23

## 2019-08-27 NOTE — TELEPHONE ENCOUNTER
Spoke with Angeles at [facility] to check on progress. PA is still pending and will hopefully have a determination by the end of the week.     Call pt to let him know - he told me to talk to his wife. I explained to pt's wife Tamiko the PA is still pending and [facility] would be contacting them for financials and delivery. She voiced understanding and asked that she be the one contacted. I also called Angeles back at [facility] to make sure Tamiko will be contacted.

## 2019-08-29 ENCOUNTER — TELEPHONE (OUTPATIENT)
Dept: GASTROENTEROLOGY | Facility: CLINIC | Age: 54
End: 2019-08-29

## 2019-08-29 DIAGNOSIS — I10 ESSENTIAL HYPERTENSION: ICD-10-CM

## 2019-08-29 NOTE — TELEPHONE ENCOUNTER
----- Message from Sean Egan sent at 8/29/2019 10:56 AM EDT -----  Regarding:  michell   Contact: [phone]  Pt wife called with questions about humira due to been sent to the pharm

## 2019-08-29 NOTE — TELEPHONE ENCOUNTER
Returned phone call. Spouse questioned when the Humira would arrive to their house? Advised will call [facility].   Called [facility] [phone], spoke with Joselyn. She states the patient's medication is pending for pharmacist review. Once this is done the patient will be called.   Returned spouse's phone call. Advised per [facility]. She verb understanding and will relay message to patient.

## 2019-08-30 RX ORDER — LISINOPRIL AND HYDROCHLOROTHIAZIDE 20; 12.5 MG/1; MG/1
TABLET ORAL
Qty: 15 TABLET | Refills: 0 | Status: SHIPPED | OUTPATIENT
Start: 2019-08-30 | End: 2019-10-09 | Stop reason: SDUPTHER

## 2019-10-09 ENCOUNTER — OFFICE VISIT (OUTPATIENT)
Dept: FAMILY MEDICINE CLINIC | Facility: CLINIC | Age: 54
End: 2019-10-09

## 2019-10-09 VITALS
OXYGEN SATURATION: 95 % | WEIGHT: 260 LBS | BODY MASS INDEX: 35.21 KG/M2 | HEART RATE: 82 BPM | SYSTOLIC BLOOD PRESSURE: 130 MMHG | DIASTOLIC BLOOD PRESSURE: 78 MMHG | HEIGHT: 72 IN

## 2019-10-09 DIAGNOSIS — N40.0 BENIGN PROSTATIC HYPERPLASIA WITHOUT URINARY OBSTRUCTION: ICD-10-CM

## 2019-10-09 DIAGNOSIS — M51.36 DEGENERATIVE DISC DISEASE, LUMBAR: ICD-10-CM

## 2019-10-09 DIAGNOSIS — E66.09 EXOGENOUS OBESITY: ICD-10-CM

## 2019-10-09 DIAGNOSIS — N52.9 IMPOTENCE OF ORGANIC ORIGIN: ICD-10-CM

## 2019-10-09 DIAGNOSIS — E55.9 VITAMIN D DEFICIENCY: ICD-10-CM

## 2019-10-09 DIAGNOSIS — K50.011 CROHN'S DISEASE OF SMALL INTESTINE WITH RECTAL BLEEDING (HCC): ICD-10-CM

## 2019-10-09 DIAGNOSIS — Z79.899 HIGH RISK MEDICATION USE: Primary | ICD-10-CM

## 2019-10-09 DIAGNOSIS — I10 ESSENTIAL HYPERTENSION: ICD-10-CM

## 2019-10-09 DIAGNOSIS — K50.111 CROHN'S DISEASE OF LARGE INTESTINE WITH RECTAL BLEEDING (HCC): ICD-10-CM

## 2019-10-09 PROBLEM — E78.2 MIXED HYPERLIPIDEMIA: Status: ACTIVE | Noted: 2017-03-30

## 2019-10-09 PROCEDURE — 99214 OFFICE O/P EST MOD 30 MIN: CPT | Performed by: FAMILY MEDICINE

## 2019-10-09 RX ORDER — SILDENAFIL CITRATE 20 MG/1
TABLET ORAL
Qty: 30 TABLET | Refills: 6 | Status: SHIPPED | OUTPATIENT
Start: 2019-10-09 | End: 2020-08-11 | Stop reason: ALTCHOICE

## 2019-10-09 RX ORDER — LISINOPRIL AND HYDROCHLOROTHIAZIDE 20; 12.5 MG/1; MG/1
1 TABLET ORAL DAILY
Qty: 90 TABLET | Refills: 1 | Status: SHIPPED | OUTPATIENT
Start: 2019-10-09 | End: 2020-04-06

## 2019-10-09 RX ORDER — HYDROCODONE BITARTRATE AND ACETAMINOPHEN 5; 325 MG/1; MG/1
1 TABLET ORAL EVERY 6 HOURS PRN
Qty: 40 TABLET | Refills: 0 | Status: SHIPPED | OUTPATIENT
Start: 2019-10-09 | End: 2021-01-19

## 2019-10-09 RX ORDER — ADALIMUMAB 40MG/0.4ML
KIT SUBCUTANEOUS
COMMUNITY
Start: 2019-09-25 | End: 2020-02-24

## 2019-10-09 RX ORDER — IBUPROFEN 800 MG/1
800 TABLET ORAL EVERY 8 HOURS PRN
Qty: 90 TABLET | Refills: 5 | Status: SHIPPED | OUTPATIENT
Start: 2019-10-09 | End: 2021-09-30 | Stop reason: SDUPTHER

## 2019-10-10 NOTE — PROGRESS NOTES
Subjective   Orlando Friedman is a 54 y.o. male with   Chief Complaint   Patient presents with   • Hypertension   • Sore Throat   • Med Refill     B/P med (90 d supply), Ibuprofen and Norco   .    History of Present Illness   4-year-old white male with history of essential hypertension here in follow-up.  Patient has been using lisinopril/hydrochlorothiazide at 20/12.5 at 1 tablet daily.  He is tolerating this regimen well and uses this on a regular basis.  He also has a history of erectile dysfunction and is asking for a refill of sildenafil.  He does have a history of significant degenerative disc disease of the lumbar spine requesting a refill of hydrocodone.  He states that the limited supply of hydrocodone usually last him about 6 months.  He has a history of Crohn's disease and is currently followed by Dr. Herbert of the GI service.  He has been using Humira pen and although his numbers on his labs have improved he continues to have loose stools on an ongoing basis often with blood.  Feels that he cannot get too far from the bathroom when she has limited his lifestyle.  Last set of fasting laboratory studies in this office has been several years ago.  The following portions of the patient's history were reviewed and updated as appropriate: allergies, current medications, past family history, past medical history, past social history, past surgical history and problem list.    Review of Systems   Constitutional:        Obese   Cardiovascular: Negative for chest pain, palpitations and leg swelling.        Hypertension   Gastrointestinal: Positive for abdominal pain, blood in stool and diarrhea.   Genitourinary:        Erectile dysfunction   Musculoskeletal: Positive for arthralgias and back pain.       Objective     Vitals:    10/09/19 1510   BP: 130/78   Pulse: 82   SpO2: 95%       No results found for this or any previous visit (from the past 672 hour(s)).    Physical Exam   Constitutional: He is oriented to  person, place, and time. He appears well-developed and well-nourished.   Obese with a BMI of 35.3   HENT:   Head: Normocephalic and atraumatic.   Neck: Trachea normal and phonation normal. Neck supple. Normal carotid pulses present. Carotid bruit is not present. No thyroid mass and no thyromegaly present.   Cardiovascular: Normal rate, regular rhythm and normal heart sounds. Exam reveals no gallop and no friction rub.   No murmur heard.  Pulmonary/Chest: Effort normal and breath sounds normal. No respiratory distress. He has no decreased breath sounds. He has no wheezes. He has no rhonchi. He has no rales.   Lymphadenopathy:     He has no cervical adenopathy.   Neurological: He is alert and oriented to person, place, and time.   Skin: Skin is warm and dry. No rash noted.   Psychiatric: He has a normal mood and affect. His speech is normal and behavior is normal. Judgment and thought content normal. Cognition and memory are normal.   Nursing note and vitals reviewed.      Assessment/Plan   Orlando was seen today for hypertension, sore throat and med refill.    Diagnoses and all orders for this visit:    High risk medication use  -     Compliance Drug Analysis, Ur - Urine, Clean Catch  -     HYDROcodone-acetaminophen (NORCO) 5-325 MG per tablet; Take 1 tablet by mouth Every 6 (Six) Hours As Needed for Moderate Pain .  -     ibuprofen (ADVIL,MOTRIN) 800 MG tablet; Take 1 tablet by mouth Every 8 (Eight) Hours As Needed for Mild Pain .  -     lisinopril-hydrochlorothiazide (PRINZIDE,ZESTORETIC) 20-12.5 MG per tablet; Take 1 tablet by mouth Daily.  -     sildenafil (REVATIO) 20 MG tablet; 2-5 po 1 hour before event  -     Lipid panel; Future  -     Comprehensive metabolic panel; Future  -     Vitamin D 25 hydroxy; Future  -     PSA DIAGNOSTIC ONLY; Future  -     TSH; Future  -     CBC w AUTO Differential; Future    Essential hypertension  -     HYDROcodone-acetaminophen (NORCO) 5-325 MG per tablet; Take 1 tablet by mouth  Every 6 (Six) Hours As Needed for Moderate Pain .  -     ibuprofen (ADVIL,MOTRIN) 800 MG tablet; Take 1 tablet by mouth Every 8 (Eight) Hours As Needed for Mild Pain .  -     lisinopril-hydrochlorothiazide (PRINZIDE,ZESTORETIC) 20-12.5 MG per tablet; Take 1 tablet by mouth Daily.  -     sildenafil (REVATIO) 20 MG tablet; 2-5 po 1 hour before event  -     Lipid panel; Future  -     Comprehensive metabolic panel; Future  -     Vitamin D 25 hydroxy; Future  -     PSA DIAGNOSTIC ONLY; Future  -     TSH; Future  -     CBC w AUTO Differential; Future    Crohn's disease of large intestine with rectal bleeding (CMS/HCC)  -     HYDROcodone-acetaminophen (NORCO) 5-325 MG per tablet; Take 1 tablet by mouth Every 6 (Six) Hours As Needed for Moderate Pain .  -     ibuprofen (ADVIL,MOTRIN) 800 MG tablet; Take 1 tablet by mouth Every 8 (Eight) Hours As Needed for Mild Pain .  -     lisinopril-hydrochlorothiazide (PRINZIDE,ZESTORETIC) 20-12.5 MG per tablet; Take 1 tablet by mouth Daily.  -     sildenafil (REVATIO) 20 MG tablet; 2-5 po 1 hour before event  -     Lipid panel; Future  -     Comprehensive metabolic panel; Future  -     Vitamin D 25 hydroxy; Future  -     PSA DIAGNOSTIC ONLY; Future  -     TSH; Future  -     CBC w AUTO Differential; Future    Crohn's disease of small intestine with rectal bleeding (CMS/HCC)  -     HYDROcodone-acetaminophen (NORCO) 5-325 MG per tablet; Take 1 tablet by mouth Every 6 (Six) Hours As Needed for Moderate Pain .  -     ibuprofen (ADVIL,MOTRIN) 800 MG tablet; Take 1 tablet by mouth Every 8 (Eight) Hours As Needed for Mild Pain .  -     lisinopril-hydrochlorothiazide (PRINZIDE,ZESTORETIC) 20-12.5 MG per tablet; Take 1 tablet by mouth Daily.  -     sildenafil (REVATIO) 20 MG tablet; 2-5 po 1 hour before event  -     Lipid panel; Future  -     Comprehensive metabolic panel; Future  -     Vitamin D 25 hydroxy; Future  -     PSA DIAGNOSTIC ONLY; Future  -     TSH; Future  -     CBC w AUTO  Differential; Future    Exogenous obesity  -     HYDROcodone-acetaminophen (NORCO) 5-325 MG per tablet; Take 1 tablet by mouth Every 6 (Six) Hours As Needed for Moderate Pain .  -     ibuprofen (ADVIL,MOTRIN) 800 MG tablet; Take 1 tablet by mouth Every 8 (Eight) Hours As Needed for Mild Pain .  -     lisinopril-hydrochlorothiazide (PRINZIDE,ZESTORETIC) 20-12.5 MG per tablet; Take 1 tablet by mouth Daily.  -     sildenafil (REVATIO) 20 MG tablet; 2-5 po 1 hour before event  -     Lipid panel; Future  -     Comprehensive metabolic panel; Future  -     Vitamin D 25 hydroxy; Future  -     PSA DIAGNOSTIC ONLY; Future  -     TSH; Future  -     CBC w AUTO Differential; Future    Vitamin D deficiency  -     HYDROcodone-acetaminophen (NORCO) 5-325 MG per tablet; Take 1 tablet by mouth Every 6 (Six) Hours As Needed for Moderate Pain .  -     ibuprofen (ADVIL,MOTRIN) 800 MG tablet; Take 1 tablet by mouth Every 8 (Eight) Hours As Needed for Mild Pain .  -     lisinopril-hydrochlorothiazide (PRINZIDE,ZESTORETIC) 20-12.5 MG per tablet; Take 1 tablet by mouth Daily.  -     sildenafil (REVATIO) 20 MG tablet; 2-5 po 1 hour before event  -     Lipid panel; Future  -     Comprehensive metabolic panel; Future  -     Vitamin D 25 hydroxy; Future  -     PSA DIAGNOSTIC ONLY; Future  -     TSH; Future  -     CBC w AUTO Differential; Future    Degenerative disc disease, lumbar  -     HYDROcodone-acetaminophen (NORCO) 5-325 MG per tablet; Take 1 tablet by mouth Every 6 (Six) Hours As Needed for Moderate Pain .  -     ibuprofen (ADVIL,MOTRIN) 800 MG tablet; Take 1 tablet by mouth Every 8 (Eight) Hours As Needed for Mild Pain .  -     lisinopril-hydrochlorothiazide (PRINZIDE,ZESTORETIC) 20-12.5 MG per tablet; Take 1 tablet by mouth Daily.  -     sildenafil (REVATIO) 20 MG tablet; 2-5 po 1 hour before event  -     Lipid panel; Future  -     Comprehensive metabolic panel; Future  -     Vitamin D 25 hydroxy; Future  -     PSA DIAGNOSTIC ONLY;  Future  -     TSH; Future  -     CBC w AUTO Differential; Future    Impotence of organic origin  -     HYDROcodone-acetaminophen (NORCO) 5-325 MG per tablet; Take 1 tablet by mouth Every 6 (Six) Hours As Needed for Moderate Pain .  -     ibuprofen (ADVIL,MOTRIN) 800 MG tablet; Take 1 tablet by mouth Every 8 (Eight) Hours As Needed for Mild Pain .  -     lisinopril-hydrochlorothiazide (PRINZIDE,ZESTORETIC) 20-12.5 MG per tablet; Take 1 tablet by mouth Daily.  -     sildenafil (REVATIO) 20 MG tablet; 2-5 po 1 hour before event  -     Lipid panel; Future  -     Comprehensive metabolic panel; Future  -     Vitamin D 25 hydroxy; Future  -     PSA DIAGNOSTIC ONLY; Future  -     TSH; Future  -     CBC w AUTO Differential; Future    Benign prostatic hypertrophy without urinary obstruction  -     HYDROcodone-acetaminophen (NORCO) 5-325 MG per tablet; Take 1 tablet by mouth Every 6 (Six) Hours As Needed for Moderate Pain .  -     ibuprofen (ADVIL,MOTRIN) 800 MG tablet; Take 1 tablet by mouth Every 8 (Eight) Hours As Needed for Mild Pain .  -     lisinopril-hydrochlorothiazide (PRINZIDE,ZESTORETIC) 20-12.5 MG per tablet; Take 1 tablet by mouth Daily.  -     sildenafil (REVATIO) 20 MG tablet; 2-5 po 1 hour before event  -     Lipid panel; Future  -     Comprehensive metabolic panel; Future  -     Vitamin D 25 hydroxy; Future  -     PSA DIAGNOSTIC ONLY; Future  -     TSH; Future  -     CBC w AUTO Differential; Future        Return in about 6 months (around 4/9/2020) for Recheck.

## 2019-10-14 LAB — DRUGS UR: NORMAL

## 2020-04-06 DIAGNOSIS — N40.0 BENIGN PROSTATIC HYPERPLASIA WITHOUT URINARY OBSTRUCTION: ICD-10-CM

## 2020-04-06 DIAGNOSIS — M51.36 DEGENERATIVE DISC DISEASE, LUMBAR: ICD-10-CM

## 2020-04-06 DIAGNOSIS — I10 ESSENTIAL HYPERTENSION: ICD-10-CM

## 2020-04-06 DIAGNOSIS — K50.111 CROHN'S DISEASE OF LARGE INTESTINE WITH RECTAL BLEEDING (HCC): ICD-10-CM

## 2020-04-06 DIAGNOSIS — N52.9 IMPOTENCE OF ORGANIC ORIGIN: ICD-10-CM

## 2020-04-06 DIAGNOSIS — Z79.899 HIGH RISK MEDICATION USE: ICD-10-CM

## 2020-04-06 DIAGNOSIS — K50.011 CROHN'S DISEASE OF SMALL INTESTINE WITH RECTAL BLEEDING (HCC): ICD-10-CM

## 2020-04-06 DIAGNOSIS — E66.09 EXOGENOUS OBESITY: ICD-10-CM

## 2020-04-06 DIAGNOSIS — E55.9 VITAMIN D DEFICIENCY: ICD-10-CM

## 2020-04-06 RX ORDER — LISINOPRIL AND HYDROCHLOROTHIAZIDE 20; 12.5 MG/1; MG/1
TABLET ORAL
Qty: 90 TABLET | Refills: 1 | Status: SHIPPED | OUTPATIENT
Start: 2020-04-06 | End: 2020-12-15

## 2020-04-10 ENCOUNTER — RESULTS ENCOUNTER (OUTPATIENT)
Dept: FAMILY MEDICINE CLINIC | Facility: CLINIC | Age: 55
End: 2020-04-10

## 2020-04-10 DIAGNOSIS — M51.36 DEGENERATIVE DISC DISEASE, LUMBAR: ICD-10-CM

## 2020-04-10 DIAGNOSIS — E55.9 VITAMIN D DEFICIENCY: ICD-10-CM

## 2020-04-10 DIAGNOSIS — E66.09 EXOGENOUS OBESITY: ICD-10-CM

## 2020-04-10 DIAGNOSIS — N52.9 IMPOTENCE OF ORGANIC ORIGIN: ICD-10-CM

## 2020-04-10 DIAGNOSIS — K50.011 CROHN'S DISEASE OF SMALL INTESTINE WITH RECTAL BLEEDING (HCC): ICD-10-CM

## 2020-04-10 DIAGNOSIS — K50.111 CROHN'S DISEASE OF LARGE INTESTINE WITH RECTAL BLEEDING (HCC): ICD-10-CM

## 2020-04-10 DIAGNOSIS — Z79.899 HIGH RISK MEDICATION USE: ICD-10-CM

## 2020-04-10 DIAGNOSIS — I10 ESSENTIAL HYPERTENSION: ICD-10-CM

## 2020-04-10 DIAGNOSIS — N40.0 BENIGN PROSTATIC HYPERPLASIA WITHOUT URINARY OBSTRUCTION: ICD-10-CM

## 2020-07-01 ENCOUNTER — OFFICE VISIT (OUTPATIENT)
Dept: GASTROENTEROLOGY | Facility: CLINIC | Age: 55
End: 2020-07-01

## 2020-07-01 VITALS
SYSTOLIC BLOOD PRESSURE: 120 MMHG | BODY MASS INDEX: 36.35 KG/M2 | TEMPERATURE: 98.2 F | WEIGHT: 268.4 LBS | HEIGHT: 72 IN | DIASTOLIC BLOOD PRESSURE: 86 MMHG

## 2020-07-01 DIAGNOSIS — R19.7 DIARRHEA, UNSPECIFIED TYPE: ICD-10-CM

## 2020-07-01 DIAGNOSIS — K50.911 CROHN'S DISEASE WITH RECTAL BLEEDING, UNSPECIFIED GASTROINTESTINAL TRACT LOCATION (HCC): Primary | ICD-10-CM

## 2020-07-01 PROCEDURE — 99214 OFFICE O/P EST MOD 30 MIN: CPT | Performed by: NURSE PRACTITIONER

## 2020-07-01 NOTE — PROGRESS NOTES
Chief Complaint   Patient presents with   • GI Problem     crohns     HPI    Orlando Friedman is a  55 y.o. male here for a very delayed follow up visit for Crohn's disease.  This patient follows with Dr. Herbert, new to me.  Patient treated for flareup of symptoms 1 year ago with budesonide in combination with oral mesalamine.  Subsequently started on Humira in August 2019.  He is just now presenting for follow-up.    Reviewed colonoscopy dated 5/31/2017 with normal ileum, congested mucosa in the rectum, polyp.  Repeat 3 years.    Today patient has has been struggling with progressive worsening of symptoms over the past several months. He is having up to 10 liquid bowel movements a day occasional rectal bleeding.  Lower abdominal cramping, gas, bloating.  He continues on Humira injections every 2 weeks.  He frequently takes Imodium.  Patient admits to little change in symptoms with initiation of Humira mainly reduce the amount of rectal bleeding he was experiencing and for unclear reasons he did not follow-up with our office.    No nausea, vomiting, acid reflux of heartburn, or dysphagia.  His appetite is good.  Weight stable.  BMI 36.4.    Past Medical History:   Diagnosis Date   • Back pain    • Benign prostatic hypertrophy without urinary obstruction 3/30/2017   • Colitis    • Crohn's disease (CMS/HCC)    • Degenerative disc disease, lumbar 3/30/2017   • Hyperlipidemia 3/30/2017   • Impotence of organic origin 3/30/2017   • Sebaceous cyst    • Vitamin D deficiency 3/30/2017       Past Surgical History:   Procedure Laterality Date   • COLONOSCOPY  2013 approx    Crohns per patiient   • COLONOSCOPY N/A 5/31/2017    One 3 mm polyp in the cecum,.  PATH: MILD POLYPOID HYPERPLASIA, CHRONIC ACTIVE INFLAMMATION, MODERATE TO SEVERE   • CYST REMOVAL     • WISDOM TOOTH EXTRACTION         Scheduled Meds:  Outpatient Encounter Medications as of 7/1/2020   Medication Sig Dispense Refill   • Adalimumab (Humira Pen) 40 MG/0.4ML  Pen-injector Kit Inject 40 mg under the skin into the appropriate area as directed Every 14 (Fourteen) Days. **Patient MUST make an appointment for further refills. Thank you. 2 each 0   • HYDROcodone-acetaminophen (NORCO) 5-325 MG per tablet Take 1 tablet by mouth Every 6 (Six) Hours As Needed for Moderate Pain . 40 tablet 0   • ibuprofen (ADVIL,MOTRIN) 800 MG tablet Take 1 tablet by mouth Every 8 (Eight) Hours As Needed for Mild Pain . 90 tablet 5   • lisinopril-hydrochlorothiazide (PRINZIDE,ZESTORETIC) 20-12.5 MG per tablet TAKE 1 TABLET BY MOUTH EVERY DAY 90 tablet 1   • Budesonide ER (UCERIS) 9 MG tablet sustained-release 24 hour Take 1 tablet by mouth Daily. 30 tablet 11   • Probiotic Product (PROBIOTIC PO) Take  by mouth.     • sildenafil (REVATIO) 20 MG tablet 2-5 po 1 hour before event 30 tablet 6   • valACYclovir (VALTREX) 1000 MG tablet 2 po BID x 1 day with outbreak 20 tablet 0     No facility-administered encounter medications on file as of 2020.        Continuous Infusions:  No current facility-administered medications for this visit.     PRN Meds:.    No Known Allergies    Social History     Socioeconomic History   • Marital status:      Spouse name: Not on file   • Number of children: Not on file   • Years of education: Not on file   • Highest education level: Not on file   Occupational History   • Occupation:    Tobacco Use   • Smoking status: Former Smoker     Packs/day: 1.50     Types: Cigarettes     Last attempt to quit:      Years since quittin.5   • Smokeless tobacco: Never Used   • Tobacco comment: CAFFEINE USE: ONE CUP DAILY.   Substance and Sexual Activity   • Alcohol use: Yes     Alcohol/week: 10.0 standard drinks     Types: 10 Cans of beer per week   • Drug use: No   • Sexual activity: Defer       Family History   Problem Relation Age of Onset   • Breast cancer Sister    • Sudden death Father 68   • Malig Hyperthermia Neg Hx        Review of Systems    Constitutional: Negative for activity change, appetite change, fatigue, fever and unexpected weight change.   HENT: Negative for trouble swallowing.    Respiratory: Negative for apnea, cough, choking, chest tightness, shortness of breath and wheezing.    Cardiovascular: Negative for chest pain, palpitations and leg swelling.   Gastrointestinal: Positive for anal bleeding and diarrhea. Negative for abdominal distention, abdominal pain, blood in stool, constipation, nausea, rectal pain and vomiting.       Vitals:    07/01/20 1345   BP: 120/86   Temp: 98.2 °F (36.8 °C)       Physical Exam   Constitutional: He is oriented to person, place, and time. He appears well-developed and well-nourished.   Eyes: Pupils are equal, round, and reactive to light.   Cardiovascular: Normal rate, regular rhythm and normal heart sounds.   Pulmonary/Chest: Effort normal and breath sounds normal. No respiratory distress. He has no wheezes.   Abdominal: Soft. Bowel sounds are normal. He exhibits no distension and no mass. There is no tenderness. There is no guarding. No hernia.   Musculoskeletal: Normal range of motion.   Neurological: He is alert and oriented to person, place, and time.   Skin: Skin is warm and dry. Capillary refill takes less than 2 seconds.   Psychiatric: He has a normal mood and affect. His behavior is normal.       No radiology results for the last 7 days    Orlando was seen today for gi problem.    Diagnoses and all orders for this visit:    Crohn's disease with rectal bleeding, unspecified gastrointestinal tract location (CMS/[facility])  -     CBC & Differential  -     Comprehensive Metabolic Panel  -     C-reactive Protein  -     Sedimentation Rate  -     Gastrointestinal Panel, PCR - Stool, Per Rectum  -     Clostridium Difficile Toxin, PCR - Stool, Per Rectum  -     Calprotectin, Fecal - Stool, Per Rectum  -     [facility] Anser ADA  -     QuantiFERON TB Gold  -     Hepatitis B Surface Antigen    Diarrhea, unspecified  type  -     CBC & Differential  -     Comprehensive Metabolic Panel  -     C-reactive Protein  -     Sedimentation Rate  -     Gastrointestinal Panel, PCR - Stool, Per Rectum  -     Clostridium Difficile Toxin, PCR - Stool, Per Rectum  -     Calprotectin, Fecal - Stool, Per Rectum  -     [facility] Anser ADA    Assessment/plan    55-year-old male with history of Crohn's disease presenting today for very delayed follow-up currently managed on biologic therapy Humira every 2 weeks complaining of lower abdominal cramping, diarrhea, and rectal bleeding.  Moving forward recommend stool testing to rule out infectious pathogens.  Check stool for inflammation with fecal calprotectin.  Obtain labs today with CBC, CMP, CRP, and sed rate.  Patient also needs annual TB Gold and hepatitis B surface antigen while on Humira.  Obtain [facility] serology to assess Humira drug levels prior to next Humira injection due Thursday of next week as well as assess for antibody formation.  I did review how to inject Humira and patient seems to be doing this correctly.    We did discuss that immunosuppressive therapy puts the patient at higher risk for infection; patient is aware and will take appropriate precautions. I did  on staying updated on vaccines, including annual flu shot. The patient is also aware of the rare but increased risk of malignancy, such as lymphoma and skin cancer, with biologic or immunosuppressive therapy as well. I also counseled the patient to follow with a dermatologist annually and sunscreen use.    Further recommendations pending the aforementioned work-up.    Return to clinic 2 weeks.

## 2020-07-02 LAB
ALBUMIN SERPL-MCNC: 4.9 G/DL (ref 3.5–5.2)
ALBUMIN/GLOB SERPL: 1.6 G/DL
ALP SERPL-CCNC: 70 U/L (ref 39–117)
ALT SERPL-CCNC: 32 U/L (ref 1–41)
AST SERPL-CCNC: 24 U/L (ref 1–40)
BASOPHILS # BLD AUTO: 0.05 10*3/MM3 (ref 0–0.2)
BASOPHILS NFR BLD AUTO: 0.9 % (ref 0–1.5)
BILIRUB SERPL-MCNC: 0.4 MG/DL (ref 0.2–1.2)
BUN SERPL-MCNC: 17 MG/DL (ref 6–20)
BUN/CREAT SERPL: 20 (ref 7–25)
CALCIUM SERPL-MCNC: 9.7 MG/DL (ref 8.6–10.5)
CHLORIDE SERPL-SCNC: 100 MMOL/L (ref 98–107)
CO2 SERPL-SCNC: 27.1 MMOL/L (ref 22–29)
CREAT SERPL-MCNC: 0.85 MG/DL (ref 0.76–1.27)
CRP SERPL-MCNC: 0.4 MG/DL (ref 0–0.5)
EOSINOPHIL # BLD AUTO: 0.12 10*3/MM3 (ref 0–0.4)
EOSINOPHIL NFR BLD AUTO: 2.1 % (ref 0.3–6.2)
ERYTHROCYTE [DISTWIDTH] IN BLOOD BY AUTOMATED COUNT: 12.6 % (ref 12.3–15.4)
ERYTHROCYTE [SEDIMENTATION RATE] IN BLOOD BY WESTERGREN METHOD: 6 MM/HR (ref 0–20)
GLOBULIN SER CALC-MCNC: 3 GM/DL
GLUCOSE SERPL-MCNC: 90 MG/DL (ref 65–99)
HBV SURFACE AG SERPL QL IA: NEGATIVE
HCT VFR BLD AUTO: 42 % (ref 37.5–51)
HGB BLD-MCNC: 14.2 G/DL (ref 13–17.7)
IMM GRANULOCYTES # BLD AUTO: 0.01 10*3/MM3 (ref 0–0.05)
IMM GRANULOCYTES NFR BLD AUTO: 0.2 % (ref 0–0.5)
LYMPHOCYTES # BLD AUTO: 2.8 10*3/MM3 (ref 0.7–3.1)
LYMPHOCYTES NFR BLD AUTO: 48.1 % (ref 19.6–45.3)
MCH RBC QN AUTO: 30.1 PG (ref 26.6–33)
MCHC RBC AUTO-ENTMCNC: 33.8 G/DL (ref 31.5–35.7)
MCV RBC AUTO: 89.2 FL (ref 79–97)
MONOCYTES # BLD AUTO: 0.44 10*3/MM3 (ref 0.1–0.9)
MONOCYTES NFR BLD AUTO: 7.6 % (ref 5–12)
NEUTROPHILS # BLD AUTO: 2.4 10*3/MM3 (ref 1.7–7)
NEUTROPHILS NFR BLD AUTO: 41.1 % (ref 42.7–76)
NRBC BLD AUTO-RTO: 0 /100 WBC (ref 0–0.2)
PLATELET # BLD AUTO: 248 10*3/MM3 (ref 140–450)
POTASSIUM SERPL-SCNC: 4.5 MMOL/L (ref 3.5–5.2)
PROT SERPL-MCNC: 7.9 G/DL (ref 6–8.5)
RBC # BLD AUTO: 4.71 10*6/MM3 (ref 4.14–5.8)
SODIUM SERPL-SCNC: 140 MMOL/L (ref 136–145)
WBC # BLD AUTO: 5.82 10*3/MM3 (ref 3.4–10.8)

## 2020-07-02 NOTE — PROGRESS NOTES
Please notify the patient that his lab work is normal.  Please complete stool testing which includes C. difficile, GI PCR, and fecal calprotectin.    He also needs to have [facility] serology and antibodies for Humira drawn before his next injection Thursday of next week.  Order is in the computer.

## 2020-07-04 LAB
GAMMA INTERFERON BACKGROUND BLD IA-ACNC: 0.03 IU/ML
M TB IFN-G BLD-IMP: NEGATIVE
M TB IFN-G CD4+ BCKGRND COR BLD-ACNC: 0.03 IU/ML
M TB IFN-G CD4+CD8+ BCKGRND COR BLD-ACNC: 0.04 IU/ML
MITOGEN IGNF BLD-ACNC: >10 IU/ML
QUANTIFERON INCUBATION: NORMAL
SERVICE CMNT-IMP: NORMAL

## 2020-07-06 NOTE — PROGRESS NOTES
Please call Orlando and remind him to complete stool testing and get his [facility] Ruthira.labs drawn this week before his next injection.

## 2020-07-17 ENCOUNTER — TELEPHONE (OUTPATIENT)
Dept: GASTROENTEROLOGY | Facility: CLINIC | Age: 55
End: 2020-07-17

## 2020-07-17 NOTE — TELEPHONE ENCOUNTER
When you look at his GI PCR and C.diff still says active.  Can we contact the lab to see what the hold-up is.  I am hoping he turned his stool test into [facility].

## 2020-07-17 NOTE — TELEPHONE ENCOUNTER
Quinn Luis RegSched Rep  [facility] [place] [address]   Phone Number: [phone]             Pt wife Ofelia is calling regarding his lab results and stool sample.

## 2020-07-17 NOTE — TELEPHONE ENCOUNTER
Patient called, spoke with spouse. Advised as per Jerica's note. She states the patient turned in his stool specimens on Wed. 7/15. She states he continues with liquid stools through out the day.   She states the patient will go on Tuesday to have his Anser Blood work drawn.   Advised will send an update to the NP regarding the patient's loose stools. She verb understanding.

## 2020-07-17 NOTE — TELEPHONE ENCOUNTER
----- Message from MAURI Ford sent at 7/6/2020  9:06 AM EDT -----  Please call Orlando and remind him to complete stool testing and get his [facility] Humira.labs drawn this week before his next injection.

## 2020-07-17 NOTE — TELEPHONE ENCOUNTER
----- Message from MAURI Ford sent at 7/2/2020 10:43 AM EDT -----  Please notify the patient that his lab work is normal.  Please complete stool testing which includes C. difficile, GI PCR, and fecal calprotectin.    He also needs to have [facility] serology and antibodies for Humira drawn before his next injection Thursday of next week.  Order is in the computer.

## 2020-07-22 ENCOUNTER — LAB (OUTPATIENT)
Dept: LAB | Facility: HOSPITAL | Age: 55
End: 2020-07-22

## 2020-07-22 PROCEDURE — 84999 UNLISTED CHEMISTRY PROCEDURE: CPT | Performed by: NURSE PRACTITIONER

## 2020-07-22 PROCEDURE — 36415 COLL VENOUS BLD VENIPUNCTURE: CPT | Performed by: NURSE PRACTITIONER

## 2020-07-22 RX ORDER — ADALIMUMAB 40MG/0.4ML
KIT SUBCUTANEOUS
Qty: 2 EACH | Refills: 0 | Status: SHIPPED | OUTPATIENT
Start: 2020-07-22 | End: 2020-09-22

## 2020-07-23 ENCOUNTER — TELEPHONE (OUTPATIENT)
Dept: GASTROENTEROLOGY | Facility: CLINIC | Age: 55
End: 2020-07-23

## 2020-07-23 NOTE — TELEPHONE ENCOUNTER
----- Message from MAURI Ford sent at 7/20/2020  8:49 AM EDT -----  GI PCR negative however still needs to complete fecal calprotectin.  Did he complete [facility] Humira labs?

## 2020-07-27 ENCOUNTER — TELEPHONE (OUTPATIENT)
Dept: GASTROENTEROLOGY | Facility: CLINIC | Age: 55
End: 2020-07-27

## 2020-07-27 NOTE — TELEPHONE ENCOUNTER
----- Message from Jim Delacruz sent at 7/27/2020 12:37 PM EDT -----  Regarding: results  Contact: [phone]  Pt wife Ofelia is calling about pt results.

## 2020-07-27 NOTE — TELEPHONE ENCOUNTER
Patient called, spoke with spouse, Ofelia(listed on [employer]). Advised of Jerica's note. Advised the fecal calprotectin was not done. Patient will need to repeat stools test. Advised Prom[facility] test should result either late this week or early next. Advised to call back on Thursday. She verb understanding.   Patient will go to the [facility] location for his stool test drop off.

## 2020-07-27 NOTE — TELEPHONE ENCOUNTER
Notes recorded by Jerica Bolivar APRN on 7/20/2020 at 8:49 AM EDT  GI PCR negative however still needs to complete fecal calprotectin.  Did he complete [facility] Humira labs?

## 2020-07-29 ENCOUNTER — TELEPHONE (OUTPATIENT)
Dept: GASTROENTEROLOGY | Facility: CLINIC | Age: 55
End: 2020-07-29

## 2020-07-29 LAB — REF LAB TEST METHOD: NORMAL

## 2020-07-29 NOTE — PROGRESS NOTES
Patient with a history of Crohn's disease on Humira since August 2019.  Having symptoms of diarrhea and rectal bleeding with lower abdominal cramping.  Humira drug levels of 5.7.  No antibodies.  Curiously normal CRP and sed rate.  Negative GI PCR but patient has not completed fecal calprotectin or C. difficile.  He is rather slow with follow-up.    His last colonoscopy was 2017 with normal ileum, congested mucosa in the rectum.  He is technically due now for colonoscopy.    What do you make of his work-up.  Fecal calprotectin would help.  Interesting that serologic inflammatory markers are normal.      Should we move the endoscopic evaluation?

## 2020-07-29 NOTE — TELEPHONE ENCOUNTER
[facility] test results have resulted. Scanned into patient's chart under media.

## 2020-07-30 DIAGNOSIS — R10.32 BILATERAL LOWER ABDOMINAL CRAMPING: ICD-10-CM

## 2020-07-30 DIAGNOSIS — R19.7 DIARRHEA, UNSPECIFIED TYPE: ICD-10-CM

## 2020-07-30 DIAGNOSIS — R10.31 BILATERAL LOWER ABDOMINAL CRAMPING: ICD-10-CM

## 2020-07-30 DIAGNOSIS — K50.911 CROHN'S DISEASE WITH RECTAL BLEEDING, UNSPECIFIED GASTROINTESTINAL TRACT LOCATION (HCC): Primary | ICD-10-CM

## 2020-07-30 RX ORDER — BUDESONIDE 9 MG/1
1 TABLET, FILM COATED, EXTENDED RELEASE ORAL DAILY
Qty: 30 TABLET | Refills: 1 | Status: SHIPPED | OUTPATIENT
Start: 2020-07-30 | End: 2020-08-26 | Stop reason: HOSPADM

## 2020-07-31 ENCOUNTER — TELEPHONE (OUTPATIENT)
Dept: GASTROENTEROLOGY | Facility: CLINIC | Age: 55
End: 2020-07-31

## 2020-07-31 NOTE — TELEPHONE ENCOUNTER
----- Message from MAURI Ford sent at 7/30/2020 12:15 PM EDT -----  Dr. Herbert and I have reviewed this patient's work-up for his symptoms.  Recommendations are to move forward with a colonoscopy and start him on budesonide which I have E scribed.  It would be beneficial if he completed the fecal calprotectin in the interim.  Case request is in.  Please set him up for colonoscopy with Dr. Herbert.

## 2020-08-03 ENCOUNTER — TELEPHONE (OUTPATIENT)
Dept: GASTROENTEROLOGY | Facility: CLINIC | Age: 55
End: 2020-08-03

## 2020-08-04 NOTE — TELEPHONE ENCOUNTER
Looks like there is already a telephone encounter containing these recommendations dated 7/31/20. We called pt same day and left a message for call back.

## 2020-08-05 PROBLEM — K50.911 CROHN'S DISEASE WITH RECTAL BLEEDING: Status: ACTIVE | Noted: 2020-08-05

## 2020-08-05 PROBLEM — R10.31 BILATERAL LOWER ABDOMINAL CRAMPING: Status: ACTIVE | Noted: 2020-08-05

## 2020-08-05 PROBLEM — R10.32 BILATERAL LOWER ABDOMINAL CRAMPING: Status: ACTIVE | Noted: 2020-08-05

## 2020-08-05 PROBLEM — R19.7 DIARRHEA: Status: ACTIVE | Noted: 2020-08-05

## 2020-08-10 DIAGNOSIS — K50.111 CROHN'S DISEASE OF LARGE INTESTINE WITH RECTAL BLEEDING (HCC): ICD-10-CM

## 2020-08-10 DIAGNOSIS — N40.0 BENIGN PROSTATIC HYPERPLASIA WITHOUT URINARY OBSTRUCTION: ICD-10-CM

## 2020-08-10 DIAGNOSIS — I10 ESSENTIAL HYPERTENSION: ICD-10-CM

## 2020-08-10 DIAGNOSIS — Z79.899 HIGH RISK MEDICATION USE: ICD-10-CM

## 2020-08-10 DIAGNOSIS — E66.09 EXOGENOUS OBESITY: ICD-10-CM

## 2020-08-10 DIAGNOSIS — M51.36 DEGENERATIVE DISC DISEASE, LUMBAR: ICD-10-CM

## 2020-08-10 DIAGNOSIS — N52.9 IMPOTENCE OF ORGANIC ORIGIN: ICD-10-CM

## 2020-08-10 DIAGNOSIS — K50.011 CROHN'S DISEASE OF SMALL INTESTINE WITH RECTAL BLEEDING (HCC): ICD-10-CM

## 2020-08-10 DIAGNOSIS — E55.9 VITAMIN D DEFICIENCY: ICD-10-CM

## 2020-08-10 RX ORDER — HYDROCODONE BITARTRATE AND ACETAMINOPHEN 5; 325 MG/1; MG/1
1 TABLET ORAL EVERY 6 HOURS PRN
Qty: 40 TABLET | Refills: 0 | Status: CANCELLED | OUTPATIENT
Start: 2020-08-10

## 2020-08-11 RX ORDER — SILDENAFIL 100 MG/1
TABLET, FILM COATED ORAL
Qty: 6 TABLET | Refills: 5 | Status: SHIPPED | OUTPATIENT
Start: 2020-08-11 | End: 2020-08-13

## 2020-08-13 ENCOUNTER — TELEPHONE (OUTPATIENT)
Dept: FAMILY MEDICINE CLINIC | Facility: CLINIC | Age: 55
End: 2020-08-13

## 2020-08-13 DIAGNOSIS — N52.9 IMPOTENCE OF ORGANIC ORIGIN: Primary | ICD-10-CM

## 2020-08-13 RX ORDER — TADALAFIL 20 MG/1
TABLET ORAL
Qty: 4 TABLET | Refills: 6 | Status: SHIPPED | OUTPATIENT
Start: 2020-08-13 | End: 2021-01-19 | Stop reason: ALTCHOICE

## 2020-08-13 NOTE — TELEPHONE ENCOUNTER
Sildenafil 100mg was requested.  Pharmacy is requesting alternative due to cost.  They recommended Vardenafil 20mg or Tadalafil 20mg.  Please advise.

## 2020-08-21 ENCOUNTER — TRANSCRIBE ORDERS (OUTPATIENT)
Dept: SLEEP MEDICINE | Facility: HOSPITAL | Age: 55
End: 2020-08-21

## 2020-08-21 DIAGNOSIS — Z01.818 OTHER SPECIFIED PRE-OPERATIVE EXAMINATION: Primary | ICD-10-CM

## 2020-08-24 ENCOUNTER — LAB (OUTPATIENT)
Dept: LAB | Facility: HOSPITAL | Age: 55
End: 2020-08-24

## 2020-08-24 DIAGNOSIS — Z01.818 OTHER SPECIFIED PRE-OPERATIVE EXAMINATION: ICD-10-CM

## 2020-08-24 PROCEDURE — C9803 HOPD COVID-19 SPEC COLLECT: HCPCS

## 2020-08-24 PROCEDURE — U0004 COV-19 TEST NON-CDC HGH THRU: HCPCS

## 2020-08-25 LAB
REF LAB TEST METHOD: NORMAL
SARS-COV-2 RNA RESP QL NAA+PROBE: NOT DETECTED

## 2020-08-26 ENCOUNTER — HOSPITAL ENCOUNTER (OUTPATIENT)
Facility: HOSPITAL | Age: 55
Setting detail: HOSPITAL OUTPATIENT SURGERY
Discharge: HOME | End: 2020-08-26
Attending: INTERNAL MEDICINE | Admitting: INTERNAL MEDICINE

## 2020-08-26 ENCOUNTER — ANESTHESIA EVENT (OUTPATIENT)
Dept: GASTROENTEROLOGY | Facility: HOSPITAL | Age: 55
End: 2020-08-26

## 2020-08-26 ENCOUNTER — ANESTHESIA (OUTPATIENT)
Dept: GASTROENTEROLOGY | Facility: HOSPITAL | Age: 55
End: 2020-08-26

## 2020-08-26 VITALS
RESPIRATION RATE: 16 BRPM | WEIGHT: 265 LBS | HEIGHT: 72 IN | OXYGEN SATURATION: 96 % | BODY MASS INDEX: 35.89 KG/M2 | HEART RATE: 68 BPM | SYSTOLIC BLOOD PRESSURE: 136 MMHG | DIASTOLIC BLOOD PRESSURE: 89 MMHG

## 2020-08-26 DIAGNOSIS — R10.31 BILATERAL LOWER ABDOMINAL CRAMPING: ICD-10-CM

## 2020-08-26 DIAGNOSIS — R19.7 DIARRHEA, UNSPECIFIED TYPE: ICD-10-CM

## 2020-08-26 DIAGNOSIS — R10.32 BILATERAL LOWER ABDOMINAL CRAMPING: ICD-10-CM

## 2020-08-26 DIAGNOSIS — K50.911 CROHN'S DISEASE WITH RECTAL BLEEDING, UNSPECIFIED GASTROINTESTINAL TRACT LOCATION (HCC): ICD-10-CM

## 2020-08-26 PROCEDURE — 25010000002 PROPOFOL 10 MG/ML EMULSION: Performed by: ANESTHESIOLOGY

## 2020-08-26 PROCEDURE — 45380 COLONOSCOPY AND BIOPSY: CPT | Performed by: INTERNAL MEDICINE

## 2020-08-26 PROCEDURE — S0260 H&P FOR SURGERY: HCPCS | Performed by: INTERNAL MEDICINE

## 2020-08-26 PROCEDURE — 88305 TISSUE EXAM BY PATHOLOGIST: CPT | Performed by: INTERNAL MEDICINE

## 2020-08-26 RX ORDER — MESALAMINE 4 G/60ML
4 ENEMA RECTAL SEE ADMIN INSTRUCTIONS
Qty: 30 BOTTLE | Refills: 5 | Status: SHIPPED | OUTPATIENT
Start: 2020-08-26 | End: 2022-01-12

## 2020-08-26 RX ORDER — PROPOFOL 10 MG/ML
VIAL (ML) INTRAVENOUS CONTINUOUS PRN
Status: DISCONTINUED | OUTPATIENT
Start: 2020-08-26 | End: 2020-08-26 | Stop reason: SURG

## 2020-08-26 RX ORDER — SODIUM CHLORIDE 0.9 % (FLUSH) 0.9 %
10 SYRINGE (ML) INJECTION AS NEEDED
Status: DISCONTINUED | OUTPATIENT
Start: 2020-08-26 | End: 2020-08-26 | Stop reason: HOSPADM

## 2020-08-26 RX ORDER — SODIUM CHLORIDE 0.9 % (FLUSH) 0.9 %
3 SYRINGE (ML) INJECTION EVERY 12 HOURS SCHEDULED
Status: DISCONTINUED | OUTPATIENT
Start: 2020-08-26 | End: 2020-08-26 | Stop reason: HOSPADM

## 2020-08-26 RX ORDER — PROPOFOL 10 MG/ML
VIAL (ML) INTRAVENOUS AS NEEDED
Status: DISCONTINUED | OUTPATIENT
Start: 2020-08-26 | End: 2020-08-26 | Stop reason: SURG

## 2020-08-26 RX ORDER — SODIUM CHLORIDE, SODIUM LACTATE, POTASSIUM CHLORIDE, CALCIUM CHLORIDE 600; 310; 30; 20 MG/100ML; MG/100ML; MG/100ML; MG/100ML
30 INJECTION, SOLUTION INTRAVENOUS CONTINUOUS PRN
Status: DISCONTINUED | OUTPATIENT
Start: 2020-08-26 | End: 2020-08-26 | Stop reason: HOSPADM

## 2020-08-26 RX ADMIN — SODIUM CHLORIDE, POTASSIUM CHLORIDE, SODIUM LACTATE AND CALCIUM CHLORIDE: 600; 310; 30; 20 INJECTION, SOLUTION INTRAVENOUS at 08:47

## 2020-08-26 RX ADMIN — PROPOFOL 50 MG: 10 INJECTION, EMULSION INTRAVENOUS at 08:56

## 2020-08-26 RX ADMIN — PROPOFOL 140 MCG/KG/MIN: 10 INJECTION, EMULSION INTRAVENOUS at 08:56

## 2020-08-26 RX ADMIN — SODIUM CHLORIDE, POTASSIUM CHLORIDE, SODIUM LACTATE AND CALCIUM CHLORIDE 30 ML/HR: 600; 310; 30; 20 INJECTION, SOLUTION INTRAVENOUS at 08:03

## 2020-08-26 NOTE — ANESTHESIA POSTPROCEDURE EVALUATION
"Patient: Orlando Friedman    Procedure Summary     Date:  08/26/20 Room / Location:   [facility]    Anesthesia Start:  0847 Anesthesia Stop:  0919    Procedure:  COLONOSCOPY with biopsies (N/A ) Diagnosis:       Crohn's disease with rectal bleeding, unspecified gastrointestinal tract location (CMS/HCC)      Diarrhea, unspecified type      Bilateral lower abdominal cramping      (Crohn's disease with rectal bleeding, unspecified gastrointestinal tract location (CMS/HCC) [K50.911])      (Diarrhea, unspecified type [R19.7])      (Bilateral lower abdominal cramping [R10.31, R10.32])    Surgeon:  Erwin Herbert MD Provider:  Mark Whitaker MD    Anesthesia Type:  MAC ASA Status:  3          Anesthesia Type: MAC    Vitals  No vitals data found for the desired time range.          Post Anesthesia Care and Evaluation    Patient location during evaluation: bedside  Patient participation: complete - patient participated  Level of consciousness: awake  Pain score: 2  Pain management: adequate  Airway patency: patent  Anesthetic complications: No anesthetic complications  PONV Status: none  Cardiovascular status: acceptable  Respiratory status: acceptable  Hydration status: acceptable    Comments: /75 (BP Location: Left arm, Patient Position: Lying)   Pulse 75   Resp 16   Ht 182.9 cm (72\")   Wt 120 kg (265 lb)   SpO2 98%   BMI 35.94 kg/m²         "

## 2020-08-26 NOTE — ANESTHESIA PREPROCEDURE EVALUATION
Anesthesia Evaluation     Patient summary reviewed and Nursing notes reviewed   NPO Solid Status: > 8 hours  NPO Liquid Status: > 2 hours           Airway   Mallampati: II  TM distance: >3 FB  Neck ROM: full  Dental - normal exam     Pulmonary - normal exam   (+) a smoker Former, sleep apnea,   Cardiovascular - normal exam    (+) hypertension, hyperlipidemia,       Neuro/Psych- negative ROS  GI/Hepatic/Renal/Endo    (+) obesity,  GI bleeding resolved,     Musculoskeletal     (+) back pain,   Abdominal    Substance History - negative use     OB/GYN negative ob/gyn ROS         Other   arthritis,                      Anesthesia Plan    ASA 3     MAC       Anesthetic plan, all risks, benefits, and alternatives have been provided, discussed and informed consent has been obtained with: patient.

## 2020-08-27 LAB
CYTO UR: NORMAL
LAB AP CASE REPORT: NORMAL
LAB AP DIAGNOSIS COMMENT: NORMAL
PATH REPORT.FINAL DX SPEC: NORMAL
PATH REPORT.GROSS SPEC: NORMAL

## 2020-08-27 RX ORDER — BUDESONIDE 9 MG/1
TABLET, FILM COATED, EXTENDED RELEASE ORAL
Qty: 30 TABLET | Refills: 1 | OUTPATIENT
Start: 2020-08-27

## 2020-09-22 RX ORDER — ADALIMUMAB 40MG/0.4ML
KIT SUBCUTANEOUS
Qty: 2 EACH | Refills: 0 | Status: SHIPPED | OUTPATIENT
Start: 2020-09-22 | End: 2020-11-19

## 2020-11-19 RX ORDER — ADALIMUMAB 40MG/0.4ML
KIT SUBCUTANEOUS
Qty: 2 EACH | Refills: 0 | Status: SHIPPED | OUTPATIENT
Start: 2020-11-19 | End: 2020-12-10

## 2020-12-10 RX ORDER — ADALIMUMAB 40MG/0.4ML
KIT SUBCUTANEOUS
Qty: 2 EACH | Refills: 0 | Status: SHIPPED | OUTPATIENT
Start: 2020-12-10 | End: 2021-02-15

## 2020-12-15 DIAGNOSIS — N52.9 IMPOTENCE OF ORGANIC ORIGIN: ICD-10-CM

## 2020-12-15 DIAGNOSIS — Z79.899 HIGH RISK MEDICATION USE: ICD-10-CM

## 2020-12-15 DIAGNOSIS — K50.011 CROHN'S DISEASE OF SMALL INTESTINE WITH RECTAL BLEEDING (HCC): ICD-10-CM

## 2020-12-15 DIAGNOSIS — E66.09 EXOGENOUS OBESITY: ICD-10-CM

## 2020-12-15 DIAGNOSIS — E55.9 VITAMIN D DEFICIENCY: ICD-10-CM

## 2020-12-15 DIAGNOSIS — N40.0 BENIGN PROSTATIC HYPERPLASIA WITHOUT URINARY OBSTRUCTION: ICD-10-CM

## 2020-12-15 DIAGNOSIS — K50.111 CROHN'S DISEASE OF LARGE INTESTINE WITH RECTAL BLEEDING (HCC): ICD-10-CM

## 2020-12-15 DIAGNOSIS — M51.36 DEGENERATIVE DISC DISEASE, LUMBAR: ICD-10-CM

## 2020-12-15 DIAGNOSIS — I10 ESSENTIAL HYPERTENSION: ICD-10-CM

## 2020-12-15 RX ORDER — LISINOPRIL AND HYDROCHLOROTHIAZIDE 20; 12.5 MG/1; MG/1
TABLET ORAL
Qty: 30 TABLET | Refills: 0 | Status: SHIPPED | OUTPATIENT
Start: 2020-12-15 | End: 2020-12-29 | Stop reason: SDUPTHER

## 2020-12-16 ENCOUNTER — TELEPHONE (OUTPATIENT)
Dept: GASTROENTEROLOGY | Facility: CLINIC | Age: 55
End: 2020-12-16

## 2020-12-16 NOTE — TELEPHONE ENCOUNTER
Patient health update: Continued stomach pains associated with bathroom. No solid stools just watery liquid. Can't go to far from bathroom due to sudden urges to go constantly. The feeling of pressure in the rectal area

## 2020-12-29 RX ORDER — LISINOPRIL AND HYDROCHLOROTHIAZIDE 20; 12.5 MG/1; MG/1
1 TABLET ORAL DAILY
Qty: 90 TABLET | Refills: 0 | Status: SHIPPED | OUTPATIENT
Start: 2020-12-29 | End: 2021-01-07 | Stop reason: SDUPTHER

## 2021-01-04 ENCOUNTER — OFFICE VISIT (OUTPATIENT)
Dept: SURGERY | Facility: CLINIC | Age: 56
End: 2021-01-04

## 2021-01-04 VITALS — WEIGHT: 269 LBS | HEIGHT: 72 IN | BODY MASS INDEX: 36.44 KG/M2

## 2021-01-04 DIAGNOSIS — L72.3 SEBACEOUS CYST: Primary | ICD-10-CM

## 2021-01-04 PROCEDURE — 87070 CULTURE OTHR SPECIMN AEROBIC: CPT | Performed by: STUDENT IN AN ORGANIZED HEALTH CARE EDUCATION/TRAINING PROGRAM

## 2021-01-04 PROCEDURE — 10060 I&D ABSCESS SIMPLE/SINGLE: CPT | Performed by: STUDENT IN AN ORGANIZED HEALTH CARE EDUCATION/TRAINING PROGRAM

## 2021-01-04 PROCEDURE — 87076 CULTURE ANAEROBE IDENT EACH: CPT | Performed by: STUDENT IN AN ORGANIZED HEALTH CARE EDUCATION/TRAINING PROGRAM

## 2021-01-04 PROCEDURE — 87205 SMEAR GRAM STAIN: CPT | Performed by: STUDENT IN AN ORGANIZED HEALTH CARE EDUCATION/TRAINING PROGRAM

## 2021-01-04 NOTE — PROGRESS NOTES
GENERAL SURGERY HISTORY AND PHYSICAL     SUMMARY:    Mr. Orlando Friedman is a 55 y.o. gentleman with an infected sebaceous cyst of the mid-upper back. I&D in the office today. Instructed on local wound care and to remove iodoform gauze tomorrow.     Referring Provider: No ref. provider found    CC:    Infected cyst     History of Present Illness:    Mr. Orlando Friedman is a 55 y.o. gentleman who presents with an infected sebaceous cyst. It has been present for a long time but rapidly expanded over the past few weeks. He tried PO antibiotics but does not think it made a significant difference. No fever or chills. He has had one prior drained by Dr. Sauer.     Past Medical History:   • BPH   • Crohn's disease  • HLD   • DDD    Past Surgical History:    • [place] tooth extraction     Family History:    • No family history of colon cancer    Social History:    • Denies tobacco use, former   • Occasional alcohol use    Allergies:   No Known Allergies    Medications:     Current Outpatient Medications:   •  Humira Pen 40 MG/0.4ML Pen-injector Kit, INJECT 1 PEN UNDER THE SKIN EVERY 14 DAYS., Disp: 2 each, Rfl: 0  •  HYDROcodone-acetaminophen (NORCO) 5-325 MG per tablet, Take 1 tablet by mouth Every 6 (Six) Hours As Needed for Moderate Pain ., Disp: 40 tablet, Rfl: 0  •  ibuprofen (ADVIL,MOTRIN) 800 MG tablet, Take 1 tablet by mouth Every 8 (Eight) Hours As Needed for Mild Pain ., Disp: 90 tablet, Rfl: 5  •  lisinopril-hydrochlorothiazide (PRINZIDE,ZESTORETIC) 20-12.5 MG per tablet, Take 1 tablet by mouth Daily., Disp: 90 tablet, Rfl: 0  •  mesalamine (ROWASA) 4 g enema, Insert 1 enema into the rectum See Admin Instructions. One enema in rectum nightly, try and retain overnight, Disp: 30 bottle, Rfl: 5  •  Probiotic Product (PROBIOTIC PO), Take  by mouth., Disp: , Rfl:   •  tadalafil (Cialis) 20 MG tablet, 1 p.o. 1 hour before event, may not use a second dose for 36 hours, Disp: 4 tablet, Rfl: 6  •  valACYclovir (VALTREX) 1000  MG tablet, 2 po BID x 1 day with outbreak, Disp: 20 tablet, Rfl: 0    Radiology/Endoscopy:    • No recent imaging    Labs:    • No recent labs     Review of Systems:   Influenza-like illness: no fever, no  cough, no  sore throat, no  body aches, no loss of sense of taste or smell, no known exposure to person with Covid-19.  Constitutional: Negative for fevers or chills  HENT: Negative for hearing loss or runny nose  Eyes: Negative for vision changes or scleral icterus  Respiratory: Negative for cough or shortness of breath  Cardiovascular: Negative for chest pain or heart palpitations  Gastrointestinal: Negative for abdominal pain, nausea, vomiting, constipation, melena, or hematochezia  Genitourinary: Negative for hematuria or dysuria  Musculoskeletal: Negative for joint swelling or gait instability  Neurologic: Negative for tremors or seizures  Psychiatric: Negative for suicidal ideations or depression  All other systems reviewed and negative    Physical Exam:   • Constitutional: Well-developed well-nourished, no acute distress  • Eyes: Conjunctiva normal, sclera nonicteric  • ENMT: Hearing grossly normal, oral mucosa moist  • Neck: Supple, trachea midline  • Respiratory: No increased work of breathing, normal inspiratory effort  • Cardiovascular: Regular rate, no peripheral edema, no jugular venous distention  • Gastrointestinal: Soft, nondistended  • Skin:  Warm, dry, no rash on visualized skin surfaces; 4cm infected, erythematous sebaceous cyst on posterior mid-upper back   • Musculoskeletal: Symmetric strength, normal gait  • Psychiatric: Alert and oriented ×3, normal affect     PROCEDURE NOTE:   Area prepped and draped in sterile fashion. 1% lidocaine with epinephrine injected into the skin and subcutaneous tissues. Incision with a scalpel made over the area of maximal fluctuance. All material evacuated. Wound was irrigated and packed with 1/4 inch iodoform gauze and dressed with 4x4 gauze and paper tape. He  tolerated the procedure well.    ALESSANDRA CARDOSO M.D.  General and Endoscopic Surgery  [facility]    [address], [address]  [place], [place], [address]  P: [phone]  F: [phone]

## 2021-01-07 DIAGNOSIS — Z79.899 HIGH RISK MEDICATION USE: ICD-10-CM

## 2021-01-07 DIAGNOSIS — K50.111 CROHN'S DISEASE OF LARGE INTESTINE WITH RECTAL BLEEDING (HCC): ICD-10-CM

## 2021-01-07 DIAGNOSIS — N52.9 IMPOTENCE OF ORGANIC ORIGIN: ICD-10-CM

## 2021-01-07 DIAGNOSIS — E66.09 EXOGENOUS OBESITY: ICD-10-CM

## 2021-01-07 DIAGNOSIS — N40.0 BENIGN PROSTATIC HYPERPLASIA WITHOUT URINARY OBSTRUCTION: ICD-10-CM

## 2021-01-07 DIAGNOSIS — I10 ESSENTIAL HYPERTENSION: ICD-10-CM

## 2021-01-07 DIAGNOSIS — K50.011 CROHN'S DISEASE OF SMALL INTESTINE WITH RECTAL BLEEDING (HCC): ICD-10-CM

## 2021-01-07 DIAGNOSIS — M51.36 DEGENERATIVE DISC DISEASE, LUMBAR: ICD-10-CM

## 2021-01-07 DIAGNOSIS — E55.9 VITAMIN D DEFICIENCY: ICD-10-CM

## 2021-01-07 RX ORDER — LISINOPRIL AND HYDROCHLOROTHIAZIDE 20; 12.5 MG/1; MG/1
1 TABLET ORAL DAILY
Qty: 90 TABLET | Refills: 0 | Status: SHIPPED | OUTPATIENT
Start: 2021-01-07 | End: 2021-01-19 | Stop reason: SDUPTHER

## 2021-01-08 LAB
BACTERIA SPEC AEROBE CULT: ABNORMAL
GRAM STN SPEC: ABNORMAL
GRAM STN SPEC: ABNORMAL

## 2021-01-19 ENCOUNTER — OFFICE VISIT (OUTPATIENT)
Dept: FAMILY MEDICINE CLINIC | Facility: CLINIC | Age: 56
End: 2021-01-19

## 2021-01-19 VITALS
HEART RATE: 82 BPM | HEIGHT: 72 IN | BODY MASS INDEX: 36.16 KG/M2 | TEMPERATURE: 98 F | SYSTOLIC BLOOD PRESSURE: 114 MMHG | WEIGHT: 267 LBS | OXYGEN SATURATION: 99 % | DIASTOLIC BLOOD PRESSURE: 70 MMHG

## 2021-01-19 DIAGNOSIS — E66.09 EXOGENOUS OBESITY: ICD-10-CM

## 2021-01-19 DIAGNOSIS — N52.9 ERECTILE DYSFUNCTION, UNSPECIFIED ERECTILE DYSFUNCTION TYPE: ICD-10-CM

## 2021-01-19 DIAGNOSIS — K50.111 CROHN'S DISEASE OF LARGE INTESTINE WITH RECTAL BLEEDING (HCC): ICD-10-CM

## 2021-01-19 DIAGNOSIS — M51.36 DEGENERATIVE DISC DISEASE, LUMBAR: ICD-10-CM

## 2021-01-19 DIAGNOSIS — Z79.899 HIGH RISK MEDICATION USE: ICD-10-CM

## 2021-01-19 DIAGNOSIS — I10 ESSENTIAL HYPERTENSION: Primary | ICD-10-CM

## 2021-01-19 DIAGNOSIS — E78.2 MIXED HYPERLIPIDEMIA: ICD-10-CM

## 2021-01-19 DIAGNOSIS — N52.9 IMPOTENCE OF ORGANIC ORIGIN: ICD-10-CM

## 2021-01-19 DIAGNOSIS — R73.02 GLUCOSE INTOLERANCE (IMPAIRED GLUCOSE TOLERANCE): ICD-10-CM

## 2021-01-19 DIAGNOSIS — K50.011 CROHN'S DISEASE OF SMALL INTESTINE WITH RECTAL BLEEDING (HCC): ICD-10-CM

## 2021-01-19 DIAGNOSIS — E55.9 VITAMIN D DEFICIENCY: ICD-10-CM

## 2021-01-19 DIAGNOSIS — N40.0 BENIGN PROSTATIC HYPERPLASIA WITHOUT URINARY OBSTRUCTION: ICD-10-CM

## 2021-01-19 PROCEDURE — 99214 OFFICE O/P EST MOD 30 MIN: CPT | Performed by: FAMILY MEDICINE

## 2021-01-19 RX ORDER — HYDROCODONE BITARTRATE AND ACETAMINOPHEN 5; 325 MG/1; MG/1
1 TABLET ORAL EVERY 6 HOURS PRN
Qty: 40 TABLET | Refills: 0 | Status: SHIPPED | OUTPATIENT
Start: 2021-01-19 | End: 2022-01-12 | Stop reason: SDUPTHER

## 2021-01-19 RX ORDER — LISINOPRIL AND HYDROCHLOROTHIAZIDE 20; 12.5 MG/1; MG/1
1 TABLET ORAL DAILY
Qty: 90 TABLET | Refills: 1 | Status: SHIPPED | OUTPATIENT
Start: 2021-01-19 | End: 2022-01-12 | Stop reason: SDUPTHER

## 2021-01-19 RX ORDER — SILDENAFIL CITRATE 20 MG/1
TABLET ORAL
Qty: 30 TABLET | Refills: 6 | Status: SHIPPED | OUTPATIENT
Start: 2021-01-19 | End: 2022-01-12 | Stop reason: SDUPTHER

## 2021-01-20 NOTE — PROGRESS NOTES
Subjective   Orlando Friedman is a 55 y.o. male with   Chief Complaint   Patient presents with   • Hypertension   • Med Refill   .    History of Present Illness   55-year-old white male with essential hypertension here for further medical management.  Patient has been using lisinopril/hydrochlorothiazide on a daily basis and tolerating this product well without side effects.  He also has history of degenerative disc disease of the lumbar spine and does request a refill of hydrocodone.  He states he rarely needs to use it but would like to have some available for those times where he suffers from an exacerbation of pain.  He also has history of erectile dysfunction and would like a refill of sildenafil.  The following portions of the patient's history were reviewed and updated as appropriate: allergies, current medications, past family history, past medical history, past social history, past surgical history and problem list.    Review of Systems   Constitutional:        Exogenous obesity   Cardiovascular:        Hypertension   Gastrointestinal:        Crohn's disease   Genitourinary:        Erectile dysfunction       Objective     Vitals:    01/19/21 1434   BP: 114/70   Pulse: 82   Temp: 98 °F (36.7 °C)   SpO2: 99%       Recent Results (from the past 672 hour(s))   Wound Culture - Wound, Back, Upper    Collection Time: 01/04/21 10:41 AM    Specimen: Back, Upper; Wound   Result Value Ref Range    Wound Culture Scant growth (1+) Propionibacterium granulosum (A)     Gram Stain Rare (1+) WBCs seen     Gram Stain No organisms seen        Physical Exam  Vitals signs and nursing note reviewed.   Constitutional:       Appearance: He is well-developed.      Comments: Exogenous obesity with a BMI of 36.2   HENT:      Head: Normocephalic and atraumatic.   Neck:      Musculoskeletal: Neck supple.      Thyroid: No thyroid mass or thyromegaly.      Vascular: Normal carotid pulses. No carotid bruit.      Trachea: Trachea and  phonation normal.   Cardiovascular:      Rate and Rhythm: Normal rate and regular rhythm.      Heart sounds: Normal heart sounds. No murmur. No friction rub. No gallop.    Pulmonary:      Effort: Pulmonary effort is normal. No respiratory distress.      Breath sounds: Normal breath sounds. No decreased breath sounds, wheezing, rhonchi or rales.   Lymphadenopathy:      Cervical: No cervical adenopathy.   Skin:     General: Skin is warm and dry.      Findings: No rash.   Neurological:      Mental Status: He is alert and oriented to person, place, and time.   Psychiatric:         Attention and Perception: Attention and perception normal.         Mood and Affect: Mood and affect normal.         Speech: Speech normal.         Behavior: Behavior normal. Behavior is cooperative.         Thought Content: Thought content normal.         Cognition and Memory: Cognition and memory normal.         Judgment: Judgment normal.         Assessment/Plan   Diagnoses and all orders for this visit:    1. Essential hypertension (Primary)  -     lisinopril-hydrochlorothiazide (PRINZIDE,ZESTORETIC) 20-12.5 MG per tablet; Take 1 tablet by mouth Daily.  Dispense: 90 tablet; Refill: 1  -     HYDROcodone-acetaminophen (NORCO) 5-325 MG per tablet; Take 1 tablet by mouth Every 6 (Six) Hours As Needed for Moderate Pain .  Dispense: 40 tablet; Refill: 0  -     Comprehensive metabolic panel; Future  -     PSA DIAGNOSTIC ONLY; Future  -     TSH; Future  -     Vitamin D 25 hydroxy; Future  -     Lipid panel; Future  -     CBC w AUTO Differential; Future  -     Hemoglobin A1c; Future    2. Mixed hyperlipidemia  -     Comprehensive metabolic panel; Future  -     PSA DIAGNOSTIC ONLY; Future  -     TSH; Future  -     Vitamin D 25 hydroxy; Future  -     Lipid panel; Future  -     CBC w AUTO Differential; Future  -     Hemoglobin A1c; Future    3. Exogenous obesity  -     lisinopril-hydrochlorothiazide (PRINZIDE,ZESTORETIC) 20-12.5 MG per tablet; Take 1  tablet by mouth Daily.  Dispense: 90 tablet; Refill: 1  -     HYDROcodone-acetaminophen (NORCO) 5-325 MG per tablet; Take 1 tablet by mouth Every 6 (Six) Hours As Needed for Moderate Pain .  Dispense: 40 tablet; Refill: 0  -     Comprehensive metabolic panel; Future  -     PSA DIAGNOSTIC ONLY; Future  -     TSH; Future  -     Vitamin D 25 hydroxy; Future  -     Lipid panel; Future  -     CBC w AUTO Differential; Future  -     Hemoglobin A1c; Future    4. Vitamin D deficiency  -     lisinopril-hydrochlorothiazide (PRINZIDE,ZESTORETIC) 20-12.5 MG per tablet; Take 1 tablet by mouth Daily.  Dispense: 90 tablet; Refill: 1  -     HYDROcodone-acetaminophen (NORCO) 5-325 MG per tablet; Take 1 tablet by mouth Every 6 (Six) Hours As Needed for Moderate Pain .  Dispense: 40 tablet; Refill: 0  -     Comprehensive metabolic panel; Future  -     PSA DIAGNOSTIC ONLY; Future  -     TSH; Future  -     Vitamin D 25 hydroxy; Future  -     Lipid panel; Future  -     CBC w AUTO Differential; Future  -     Hemoglobin A1c; Future    5. High risk medication use  -     lisinopril-hydrochlorothiazide (PRINZIDE,ZESTORETIC) 20-12.5 MG per tablet; Take 1 tablet by mouth Daily.  Dispense: 90 tablet; Refill: 1  -     HYDROcodone-acetaminophen (NORCO) 5-325 MG per tablet; Take 1 tablet by mouth Every 6 (Six) Hours As Needed for Moderate Pain .  Dispense: 40 tablet; Refill: 0  -     Comprehensive metabolic panel; Future  -     PSA DIAGNOSTIC ONLY; Future  -     TSH; Future  -     Vitamin D 25 hydroxy; Future  -     Lipid panel; Future  -     CBC w AUTO Differential; Future  -     Hemoglobin A1c; Future    6. Crohn's disease of large intestine with rectal bleeding (CMS/HCC)  -     lisinopril-hydrochlorothiazide (PRINZIDE,ZESTORETIC) 20-12.5 MG per tablet; Take 1 tablet by mouth Daily.  Dispense: 90 tablet; Refill: 1  -     HYDROcodone-acetaminophen (NORCO) 5-325 MG per tablet; Take 1 tablet by mouth Every 6 (Six) Hours As Needed for Moderate Pain .   Dispense: 40 tablet; Refill: 0  -     Comprehensive metabolic panel; Future  -     PSA DIAGNOSTIC ONLY; Future  -     TSH; Future  -     Vitamin D 25 hydroxy; Future  -     Lipid panel; Future  -     CBC w AUTO Differential; Future  -     Hemoglobin A1c; Future    7. Crohn's disease of small intestine with rectal bleeding (CMS/HCC)  -     lisinopril-hydrochlorothiazide (PRINZIDE,ZESTORETIC) 20-12.5 MG per tablet; Take 1 tablet by mouth Daily.  Dispense: 90 tablet; Refill: 1  -     HYDROcodone-acetaminophen (NORCO) 5-325 MG per tablet; Take 1 tablet by mouth Every 6 (Six) Hours As Needed for Moderate Pain .  Dispense: 40 tablet; Refill: 0  -     Comprehensive metabolic panel; Future  -     PSA DIAGNOSTIC ONLY; Future  -     TSH; Future  -     Vitamin D 25 hydroxy; Future  -     Lipid panel; Future  -     CBC w AUTO Differential; Future  -     Hemoglobin A1c; Future    8. Degenerative disc disease, lumbar  -     lisinopril-hydrochlorothiazide (PRINZIDE,ZESTORETIC) 20-12.5 MG per tablet; Take 1 tablet by mouth Daily.  Dispense: 90 tablet; Refill: 1  -     HYDROcodone-acetaminophen (NORCO) 5-325 MG per tablet; Take 1 tablet by mouth Every 6 (Six) Hours As Needed for Moderate Pain .  Dispense: 40 tablet; Refill: 0  -     Comprehensive metabolic panel; Future  -     PSA DIAGNOSTIC ONLY; Future  -     TSH; Future  -     Vitamin D 25 hydroxy; Future  -     Lipid panel; Future  -     CBC w AUTO Differential; Future  -     Hemoglobin A1c; Future    9. Impotence of organic origin  -     lisinopril-hydrochlorothiazide (PRINZIDE,ZESTORETIC) 20-12.5 MG per tablet; Take 1 tablet by mouth Daily.  Dispense: 90 tablet; Refill: 1  -     sildenafil (REVATIO) 20 MG tablet; 2-5 po 1 hour before intercours  Dispense: 30 tablet; Refill: 6  -     HYDROcodone-acetaminophen (NORCO) 5-325 MG per tablet; Take 1 tablet by mouth Every 6 (Six) Hours As Needed for Moderate Pain .  Dispense: 40 tablet; Refill: 0  -     Comprehensive metabolic panel;  Future  -     PSA DIAGNOSTIC ONLY; Future  -     TSH; Future  -     Vitamin D 25 hydroxy; Future  -     Lipid panel; Future  -     CBC w AUTO Differential; Future  -     Hemoglobin A1c; Future    10. Benign prostatic hypertrophy without urinary obstruction  -     lisinopril-hydrochlorothiazide (PRINZIDE,ZESTORETIC) 20-12.5 MG per tablet; Take 1 tablet by mouth Daily.  Dispense: 90 tablet; Refill: 1  -     HYDROcodone-acetaminophen (NORCO) 5-325 MG per tablet; Take 1 tablet by mouth Every 6 (Six) Hours As Needed for Moderate Pain .  Dispense: 40 tablet; Refill: 0  -     Comprehensive metabolic panel; Future  -     PSA DIAGNOSTIC ONLY; Future  -     TSH; Future  -     Vitamin D 25 hydroxy; Future  -     Lipid panel; Future  -     CBC w AUTO Differential; Future  -     Hemoglobin A1c; Future    11. Erectile dysfunction, unspecified erectile dysfunction type  -     Comprehensive metabolic panel; Future  -     PSA DIAGNOSTIC ONLY; Future  -     TSH; Future  -     Vitamin D 25 hydroxy; Future  -     Lipid panel; Future  -     CBC w AUTO Differential; Future  -     Hemoglobin A1c; Future    12. Glucose intolerance (impaired glucose tolerance)  -     Comprehensive metabolic panel; Future  -     PSA DIAGNOSTIC ONLY; Future  -     TSH; Future  -     Vitamin D 25 hydroxy; Future  -     Lipid panel; Future  -     CBC w AUTO Differential; Future  -     Hemoglobin A1c; Future        Return in about 6 months (around 7/19/2021) for Recheck.

## 2021-02-01 ENCOUNTER — TELEPHONE (OUTPATIENT)
Dept: GASTROENTEROLOGY | Facility: CLINIC | Age: 56
End: 2021-02-01

## 2021-02-01 NOTE — TELEPHONE ENCOUNTER
----- Message from Jim Torres sent at 2/1/2021  3:49 PM EST -----  Regarding: Pt symptoms are worse  Contact: [phone]  Pt is having lots of bleeding, fatigue, very frequent bm and is worsening. Humira is not working. Please call pt to advise what to do. Pt has appt 3/9/21 with Dr Herbert. Thank you

## 2021-02-11 DIAGNOSIS — K50.911 CROHN'S DISEASE WITH RECTAL BLEEDING, UNSPECIFIED GASTROINTESTINAL TRACT LOCATION (HCC): Primary | ICD-10-CM

## 2021-02-11 NOTE — TELEPHONE ENCOUNTER
Returned phone call to spouse. She states the patient is having a flare. She states he is passing at least 20 liquid stools every day, reports bright red rectal bleeding, bloating, gas, abdominal pain, glenis-anal area and  rectal pressure, has external hemorrhoids. Not sure if patient is running a fever.   Patient has been taking his Humira and his Rowasa enemas and is still having breakthrough symptoms.   Patient is being extra cautious about his diet.   Spouse reports patient took antibiotics about a month ago after having a cyst removed.   Update to Jerica.

## 2021-02-11 NOTE — TELEPHONE ENCOUNTER
All orders are in.  I think he would certainly benefit from having Humira drug levels and antibody testing before his next injection as well.

## 2021-02-12 NOTE — TELEPHONE ENCOUNTER
Patient's appointment time has been switch from Thursday 2/18 to Tuesday 2/16@0815. Patient agreeable.

## 2021-02-15 ENCOUNTER — LAB (OUTPATIENT)
Dept: GASTROENTEROLOGY | Facility: CLINIC | Age: 56
End: 2021-02-15

## 2021-02-15 LAB
ALBUMIN SERPL-MCNC: 4.3 G/DL (ref 3.5–5.2)
ALBUMIN/GLOB SERPL: 1.6 G/DL
ALP SERPL-CCNC: 74 U/L (ref 39–117)
ALT SERPL-CCNC: 24 U/L (ref 1–41)
AST SERPL-CCNC: 20 U/L (ref 1–40)
BASOPHILS # BLD AUTO: 0.06 10*3/MM3 (ref 0–0.2)
BASOPHILS NFR BLD AUTO: 0.9 % (ref 0–1.5)
BILIRUB SERPL-MCNC: 0.3 MG/DL (ref 0–1.2)
BUN SERPL-MCNC: 17 MG/DL (ref 6–20)
BUN/CREAT SERPL: 19.8 (ref 7–25)
CALCIUM SERPL-MCNC: 9.7 MG/DL (ref 8.6–10.5)
CHLORIDE SERPL-SCNC: 103 MMOL/L (ref 98–107)
CO2 SERPL-SCNC: 28.4 MMOL/L (ref 22–29)
CREAT SERPL-MCNC: 0.86 MG/DL (ref 0.76–1.27)
CRP SERPL-MCNC: 0.45 MG/DL (ref 0–0.5)
EOSINOPHIL # BLD AUTO: 0.14 10*3/MM3 (ref 0–0.4)
EOSINOPHIL NFR BLD AUTO: 2 % (ref 0.3–6.2)
ERYTHROCYTE [DISTWIDTH] IN BLOOD BY AUTOMATED COUNT: 12.4 % (ref 12.3–15.4)
ERYTHROCYTE [SEDIMENTATION RATE] IN BLOOD BY WESTERGREN METHOD: 15 MM/HR (ref 0–20)
GLOBULIN SER CALC-MCNC: 2.7 GM/DL
GLUCOSE SERPL-MCNC: 95 MG/DL (ref 65–99)
HCT VFR BLD AUTO: 40.3 % (ref 37.5–51)
HGB BLD-MCNC: 13.4 G/DL (ref 13–17.7)
IMM GRANULOCYTES # BLD AUTO: 0.04 10*3/MM3 (ref 0–0.05)
IMM GRANULOCYTES NFR BLD AUTO: 0.6 % (ref 0–0.5)
LYMPHOCYTES # BLD AUTO: 3.32 10*3/MM3 (ref 0.7–3.1)
LYMPHOCYTES NFR BLD AUTO: 47.9 % (ref 19.6–45.3)
MCH RBC QN AUTO: 29.2 PG (ref 26.6–33)
MCHC RBC AUTO-ENTMCNC: 33.3 G/DL (ref 31.5–35.7)
MCV RBC AUTO: 87.8 FL (ref 79–97)
MONOCYTES # BLD AUTO: 0.42 10*3/MM3 (ref 0.1–0.9)
MONOCYTES NFR BLD AUTO: 6.1 % (ref 5–12)
NEUTROPHILS # BLD AUTO: 2.95 10*3/MM3 (ref 1.7–7)
NEUTROPHILS NFR BLD AUTO: 42.5 % (ref 42.7–76)
NRBC BLD AUTO-RTO: 0 /100 WBC (ref 0–0.2)
PLATELET # BLD AUTO: 308 10*3/MM3 (ref 140–450)
POTASSIUM SERPL-SCNC: 4.3 MMOL/L (ref 3.5–5.2)
PROT SERPL-MCNC: 7 G/DL (ref 6–8.5)
RBC # BLD AUTO: 4.59 10*6/MM3 (ref 4.14–5.8)
SODIUM SERPL-SCNC: 142 MMOL/L (ref 136–145)
WBC # BLD AUTO: 6.93 10*3/MM3 (ref 3.4–10.8)

## 2021-02-15 RX ORDER — ADALIMUMAB 40MG/0.4ML
KIT SUBCUTANEOUS
Qty: 2 EACH | Refills: 11 | Status: SHIPPED | OUTPATIENT
Start: 2021-02-15 | End: 2022-01-12

## 2021-02-16 ENCOUNTER — OFFICE VISIT (OUTPATIENT)
Dept: GASTROENTEROLOGY | Facility: CLINIC | Age: 56
End: 2021-02-16

## 2021-02-16 ENCOUNTER — TELEPHONE (OUTPATIENT)
Dept: GASTROENTEROLOGY | Facility: CLINIC | Age: 56
End: 2021-02-16

## 2021-02-16 VITALS — WEIGHT: 271.4 LBS | BODY MASS INDEX: 36.76 KG/M2 | TEMPERATURE: 97 F | HEIGHT: 72 IN

## 2021-02-16 DIAGNOSIS — K50.811 CROHN'S DISEASE OF BOTH SMALL AND LARGE INTESTINE WITH RECTAL BLEEDING (HCC): Primary | ICD-10-CM

## 2021-02-16 LAB — C DIFF TOX A+B STL QL IA: NEGATIVE

## 2021-02-16 PROCEDURE — 99214 OFFICE O/P EST MOD 30 MIN: CPT | Performed by: INTERNAL MEDICINE

## 2021-02-16 RX ORDER — HYDROCORTISONE 25 MG/G
CREAM TOPICAL 2 TIMES DAILY
Qty: 28 G | Refills: 2 | Status: SHIPPED | OUTPATIENT
Start: 2021-02-16 | End: 2021-03-02

## 2021-02-16 NOTE — TELEPHONE ENCOUNTER
Lm on pt justino md is in office but if pt can not make it due to weather they can call and change their visit to a mychart visit or telephone visit

## 2021-02-16 NOTE — PROGRESS NOTES
Chief Complaint   Patient presents with   • Crohn's Disease       Orlando Friedman is a  55 y.o. male here for a follow up visit for Crohn's of the large and small bowel.    HPI     Patient 55-year-old male with history of Crohn's disease with recurrent colitis.  Patient has been complaining of recurrent diarrhea up to 20 times daily.  Patient seen in July and August for similar complaints while his labs at that time were all normal colonoscopy revealed active disease in the sigmoid and rectum.  Patient again with normal labs yesterday though stool studies are pending continues to have reports 20 bowel movements a day here for further recommendations.    Past Medical History:   Diagnosis Date   • Back pain    • Benign prostatic hypertrophy without urinary obstruction 3/30/2017   • Colitis    • Crohn's disease (CMS/HCC)    • Degenerative disc disease, lumbar 3/30/2017   • Hyperlipidemia 3/30/2017   • Impotence of organic origin 3/30/2017   • Sebaceous cyst    • Vitamin D deficiency 3/30/2017         Current Outpatient Medications:   •  Humira Pen 40 MG/0.4ML Pen-injector Kit, INJECT 1 PEN UNDER THE SKIN EVERY 14 DAYS., Disp: 2 each, Rfl: 11  •  HYDROcodone-acetaminophen (NORCO) 5-325 MG per tablet, Take 1 tablet by mouth Every 6 (Six) Hours As Needed for Moderate Pain ., Disp: 40 tablet, Rfl: 0  •  ibuprofen (ADVIL,MOTRIN) 800 MG tablet, Take 1 tablet by mouth Every 8 (Eight) Hours As Needed for Mild Pain ., Disp: 90 tablet, Rfl: 5  •  lisinopril-hydrochlorothiazide (PRINZIDE,ZESTORETIC) 20-12.5 MG per tablet, Take 1 tablet by mouth Daily., Disp: 90 tablet, Rfl: 1  •  sildenafil (REVATIO) 20 MG tablet, 2-5 po 1 hour before intercours, Disp: 30 tablet, Rfl: 6  •  mesalamine (ROWASA) 4 g enema, Insert 1 enema into the rectum See Admin Instructions. One enema in rectum nightly, try and retain overnight, Disp: 30 bottle, Rfl: 5  •  Probiotic Product (PROBIOTIC PO), Take 1 capsule by mouth Daily., Disp: , Rfl:     No Known  Allergies    Social History     Socioeconomic History   • Marital status:      Spouse name: Not on file   • Number of children: Not on file   • Years of education: Not on file   • Highest education level: Not on file   Occupational History   • Occupation:    Tobacco Use   • Smoking status: Former Smoker     Packs/day: 1.50     Types: Cigarettes     Quit date:      Years since quittin.1   • Smokeless tobacco: Never Used   • Tobacco comment: CAFFEINE USE: ONE CUP DAILY.   Substance and Sexual Activity   • Alcohol use: Yes     Alcohol/week: 10.0 standard drinks     Types: 10 Cans of beer per week   • Drug use: No   • Sexual activity: Defer       Family History   Problem Relation Age of Onset   • Breast cancer Sister    • Sudden death Father 68   • Malig Hyperthermia Neg Hx        Review of Systems   Constitutional: Negative.    Respiratory: Negative.    Cardiovascular: Negative.    Gastrointestinal: Positive for abdominal pain, blood in stool and diarrhea. Negative for abdominal distention, constipation, nausea, rectal pain and vomiting.   Musculoskeletal: Negative.    Skin: Negative.    Hematological: Negative.        Vitals:    21 1336   Temp: 97 °F (36.1 °C)       Physical Exam  Vitals signs reviewed.   Constitutional:       Appearance: He is well-developed.   HENT:      Head: Normocephalic and atraumatic.   Eyes:      General: No scleral icterus.     Pupils: Pupils are equal, round, and reactive to light.   Cardiovascular:      Rate and Rhythm: Normal rate and regular rhythm.      Heart sounds: Normal heart sounds.   Pulmonary:      Effort: Pulmonary effort is normal. No respiratory distress.      Breath sounds: Normal breath sounds.   Abdominal:      General: Bowel sounds are normal. There is no distension.      Palpations: Abdomen is soft. There is no mass.      Tenderness: There is no abdominal tenderness.      Hernia: No hernia is present.   Skin:     General: Skin is warm and  dry.      Coloration: Skin is not jaundiced.      Findings: No rash.   Neurological:      General: No focal deficit present.      Mental Status: He is alert and oriented to person, place, and time.   Psychiatric:         Behavior: Behavior normal.         Thought Content: Thought content normal.         Judgment: Judgment normal.         Orders Only on 02/11/2021   Component Date Value Ref Range Status   • WBC 02/15/2021 6.93  3.40 - 10.80 10*3/mm3 Final   • RBC 02/15/2021 4.59  4.14 - 5.80 10*6/mm3 Final   • Hemoglobin 02/15/2021 13.4  13.0 - 17.7 g/dL Final   • Hematocrit 02/15/2021 40.3  37.5 - 51.0 % Final   • MCV 02/15/2021 87.8  79.0 - 97.0 fL Final   • MCH 02/15/2021 29.2  26.6 - 33.0 pg Final   • MCHC 02/15/2021 33.3  31.5 - 35.7 g/dL Final   • RDW 02/15/2021 12.4  12.3 - 15.4 % Final   • Platelets 02/15/2021 308  140 - 450 10*3/mm3 Final   • Neutrophil Rel % 02/15/2021 42.5* 42.7 - 76.0 % Final   • Lymphocyte Rel % 02/15/2021 47.9* 19.6 - 45.3 % Final   • Monocyte Rel % 02/15/2021 6.1  5.0 - 12.0 % Final   • Eosinophil Rel % 02/15/2021 2.0  0.3 - 6.2 % Final   • Basophil Rel % 02/15/2021 0.9  0.0 - 1.5 % Final   • Neutrophils Absolute 02/15/2021 2.95  1.70 - 7.00 10*3/mm3 Final   • Lymphocytes Absolute 02/15/2021 3.32* 0.70 - 3.10 10*3/mm3 Final   • Monocytes Absolute 02/15/2021 0.42  0.10 - 0.90 10*3/mm3 Final   • Eosinophils Absolute 02/15/2021 0.14  0.00 - 0.40 10*3/mm3 Final   • Basophils Absolute 02/15/2021 0.06  0.00 - 0.20 10*3/mm3 Final   • Immature Granulocyte Rel % 02/15/2021 0.6* 0.0 - 0.5 % Final   • Immature Grans Absolute 02/15/2021 0.04  0.00 - 0.05 10*3/mm3 Final   • nRBC 02/15/2021 0.0  0.0 - 0.2 /100 WBC Final   • Glucose 02/15/2021 95  65 - 99 mg/dL Final   • BUN 02/15/2021 17  6 - 20 mg/dL Final   • Creatinine 02/15/2021 0.86  0.76 - 1.27 mg/dL Final   • eGFR Non  Am 02/15/2021 93  >60 mL/min/1.73 Final   • eGFR African Am 02/15/2021 113  >60 mL/min/1.73 Final   • BUN/Creatinine  Ratio 02/15/2021 19.8  7.0 - 25.0 Final   • Sodium 02/15/2021 142  136 - 145 mmol/L Final   • Potassium 02/15/2021 4.3  3.5 - 5.2 mmol/L Final   • Chloride 02/15/2021 103  98 - 107 mmol/L Final   • Total CO2 02/15/2021 28.4  22.0 - 29.0 mmol/L Final   • Calcium 02/15/2021 9.7  8.6 - 10.5 mg/dL Final   • Total Protein 02/15/2021 7.0  6.0 - 8.5 g/dL Final   • Albumin 02/15/2021 4.30  3.50 - 5.20 g/dL Final   • Globulin 02/15/2021 2.7  gm/dL Final   • A/G Ratio 02/15/2021 1.6  g/dL Final   • Total Bilirubin 02/15/2021 0.3  0.0 - 1.2 mg/dL Final   • Alkaline Phosphatase 02/15/2021 74  39 - 117 U/L Final   • AST (SGOT) 02/15/2021 20  1 - 40 U/L Final   • ALT (SGPT) 02/15/2021 24  1 - 41 U/L Final   • C-Reactive Protein 02/15/2021 0.45  0.00 - 0.50 mg/dL Final   • Sed Rate 02/15/2021 15  0 - 20 mm/hr Final   • C difficile Toxins A+B, EIA 02/14/2021 Negative  Negative Final   Office Visit on 01/04/2021   Component Date Value Ref Range Status   • Wound Culture 01/04/2021 Scant growth (1+) Propionibacterium granulosum*  Final   • Gram Stain 01/04/2021 Rare (1+) WBCs seen   Final   • Gram Stain 01/04/2021 No organisms seen   Final       Diagnoses and all orders for this visit:    1. Crohn's disease of both small and large intestine with rectal bleeding (CMS/HCC) (Primary)      Patient 55-year-old male with history of Crohn's with recurrent diarrhea.  Patient taking his Humira regularly last checked in July and August with ongoing diarrhea had good labs at that time with good levels and no antibodies to his Humira.  Patient now having up to 20 bowel movements a day taking his Humira and mesalamine enemas but still having significant diarrhea.  Labs are all normal including CBC CMP ESR and sed rate.  Of note they were normal when he had active disease noted on his colonoscopy in August.  At this point have to assume he is a failure on Humira will arrange Entyvio as next best choice, will request approval from insurance and  begin infusions soon as possible.

## 2021-02-16 NOTE — TELEPHONE ENCOUNTER
----- Message from Erwin Herbert MD sent at 2/16/2021  2:42 PM EST -----  Can we see if we can get approval for Entyvio, patient failed Humira

## 2021-02-17 ENCOUNTER — TELEPHONE (OUTPATIENT)
Dept: GASTROENTEROLOGY | Facility: CLINIC | Age: 56
End: 2021-02-17

## 2021-02-17 NOTE — TELEPHONE ENCOUNTER
----- Message from Jim Torres sent at 2/17/2021  2:24 PM EST -----  Regarding: jose francisco  Contact: [phone]  Ofelia, pt's wife, is calling to check on status of Entyvio. Please call. Thank you

## 2021-02-19 NOTE — TELEPHONE ENCOUNTER
Entyvio referral faxed with clinicals to [facility] with confirmation received. Awaiting decision.

## 2021-02-25 LAB — CALPROTECTIN STL-MCNT: 336 UG/G (ref 0–120)

## 2021-06-02 ENCOUNTER — TELEPHONE (OUTPATIENT)
Dept: GASTROENTEROLOGY | Facility: CLINIC | Age: 56
End: 2021-06-02

## 2021-06-02 NOTE — TELEPHONE ENCOUNTER
----- Message from Jim Delacruz sent at 6/2/2021 10:12 AM EDT -----  Regarding: entyvio  Contact: [phone]  Pt wife Ofelia is calling regarding his prescription for Entyvio. He needs his dose before going out of town on 6/4.

## 2021-06-10 ENCOUNTER — TELEPHONE (OUTPATIENT)
Dept: FAMILY MEDICINE CLINIC | Facility: CLINIC | Age: 56
End: 2021-06-10

## 2021-06-10 RX ORDER — VALACYCLOVIR HYDROCHLORIDE 1 G/1
TABLET, FILM COATED ORAL
Qty: 20 TABLET | Refills: 0 | Status: SHIPPED | OUTPATIENT
Start: 2021-06-10 | End: 2022-09-27

## 2021-06-10 NOTE — TELEPHONE ENCOUNTER
Caller: ElderOfelia    Relationship: Emergency Contact    Best call back number: [phone]    What medication are you requesting: SOMETHING FOR A FEVER BLISTER FROM THE SUN    What are your current symptoms: SUN FEVER BLISTER    How long have you been experiencing symptoms:   2 DAYS AGO    Have you been treated for these symptoms before:   [] Yes  [x] No    If a prescription is needed, what is your preferred pharmacy and phone number:      [id number] - [place], [place] - [address] AT [facility] - [phone] [facility] [phone] FX     Additional notes: PATIENT IS ON VACATION AND NEEDS THIS SENT TO PHARMACY ABOVE

## 2021-09-29 DIAGNOSIS — N52.9 IMPOTENCE OF ORGANIC ORIGIN: ICD-10-CM

## 2021-09-29 DIAGNOSIS — E55.9 VITAMIN D DEFICIENCY: ICD-10-CM

## 2021-09-29 DIAGNOSIS — K50.111 CROHN'S DISEASE OF LARGE INTESTINE WITH RECTAL BLEEDING (HCC): ICD-10-CM

## 2021-09-29 DIAGNOSIS — Z79.899 HIGH RISK MEDICATION USE: ICD-10-CM

## 2021-09-29 DIAGNOSIS — M51.36 DEGENERATIVE DISC DISEASE, LUMBAR: ICD-10-CM

## 2021-09-29 DIAGNOSIS — I10 ESSENTIAL HYPERTENSION: ICD-10-CM

## 2021-09-29 DIAGNOSIS — N40.0 BENIGN PROSTATIC HYPERPLASIA WITHOUT URINARY OBSTRUCTION: ICD-10-CM

## 2021-09-29 DIAGNOSIS — K50.011 CROHN'S DISEASE OF SMALL INTESTINE WITH RECTAL BLEEDING (HCC): ICD-10-CM

## 2021-09-29 DIAGNOSIS — E66.09 EXOGENOUS OBESITY: ICD-10-CM

## 2021-09-30 DIAGNOSIS — K50.011 CROHN'S DISEASE OF SMALL INTESTINE WITH RECTAL BLEEDING (HCC): ICD-10-CM

## 2021-09-30 DIAGNOSIS — M51.36 DEGENERATIVE DISC DISEASE, LUMBAR: ICD-10-CM

## 2021-09-30 DIAGNOSIS — I10 ESSENTIAL HYPERTENSION: ICD-10-CM

## 2021-09-30 DIAGNOSIS — E66.09 EXOGENOUS OBESITY: ICD-10-CM

## 2021-09-30 DIAGNOSIS — E55.9 VITAMIN D DEFICIENCY: ICD-10-CM

## 2021-09-30 DIAGNOSIS — K50.111 CROHN'S DISEASE OF LARGE INTESTINE WITH RECTAL BLEEDING (HCC): ICD-10-CM

## 2021-09-30 DIAGNOSIS — N40.0 BENIGN PROSTATIC HYPERPLASIA WITHOUT URINARY OBSTRUCTION: ICD-10-CM

## 2021-09-30 DIAGNOSIS — N52.9 IMPOTENCE OF ORGANIC ORIGIN: ICD-10-CM

## 2021-09-30 DIAGNOSIS — Z79.899 HIGH RISK MEDICATION USE: ICD-10-CM

## 2021-09-30 RX ORDER — IBUPROFEN 800 MG/1
800 TABLET ORAL EVERY 8 HOURS PRN
Qty: 90 TABLET | Refills: 5 | OUTPATIENT
Start: 2021-09-30

## 2021-09-30 RX ORDER — IBUPROFEN 800 MG/1
800 TABLET ORAL EVERY 8 HOURS PRN
Qty: 90 TABLET | Refills: 0 | Status: SHIPPED | OUTPATIENT
Start: 2021-09-30 | End: 2022-01-12 | Stop reason: SDUPTHER

## 2021-11-05 DIAGNOSIS — K50.011 CROHN'S DISEASE OF SMALL INTESTINE WITH RECTAL BLEEDING (HCC): ICD-10-CM

## 2021-11-05 DIAGNOSIS — N52.9 IMPOTENCE OF ORGANIC ORIGIN: ICD-10-CM

## 2021-11-05 DIAGNOSIS — N40.0 BENIGN PROSTATIC HYPERPLASIA WITHOUT URINARY OBSTRUCTION: ICD-10-CM

## 2021-11-05 DIAGNOSIS — E55.9 VITAMIN D DEFICIENCY: ICD-10-CM

## 2021-11-05 DIAGNOSIS — K50.111 CROHN'S DISEASE OF LARGE INTESTINE WITH RECTAL BLEEDING (HCC): ICD-10-CM

## 2021-11-05 DIAGNOSIS — I10 ESSENTIAL HYPERTENSION: ICD-10-CM

## 2021-11-05 DIAGNOSIS — E66.09 EXOGENOUS OBESITY: ICD-10-CM

## 2021-11-05 DIAGNOSIS — M51.36 DEGENERATIVE DISC DISEASE, LUMBAR: ICD-10-CM

## 2021-11-05 DIAGNOSIS — Z79.899 HIGH RISK MEDICATION USE: ICD-10-CM

## 2021-11-05 RX ORDER — IBUPROFEN 800 MG/1
800 TABLET ORAL EVERY 8 HOURS PRN
Qty: 90 TABLET | Refills: 0 | OUTPATIENT
Start: 2021-11-05

## 2022-01-12 ENCOUNTER — OFFICE VISIT (OUTPATIENT)
Dept: FAMILY MEDICINE CLINIC | Facility: CLINIC | Age: 57
End: 2022-01-12

## 2022-01-12 ENCOUNTER — TELEPHONE (OUTPATIENT)
Dept: FAMILY MEDICINE CLINIC | Facility: CLINIC | Age: 57
End: 2022-01-12

## 2022-01-12 VITALS
DIASTOLIC BLOOD PRESSURE: 84 MMHG | WEIGHT: 272 LBS | HEART RATE: 74 BPM | OXYGEN SATURATION: 97 % | SYSTOLIC BLOOD PRESSURE: 124 MMHG | TEMPERATURE: 97.3 F | BODY MASS INDEX: 36.84 KG/M2 | HEIGHT: 72 IN

## 2022-01-12 DIAGNOSIS — E55.9 VITAMIN D DEFICIENCY: ICD-10-CM

## 2022-01-12 DIAGNOSIS — K50.111 CROHN'S DISEASE OF LARGE INTESTINE WITH RECTAL BLEEDING: ICD-10-CM

## 2022-01-12 DIAGNOSIS — N52.9 IMPOTENCE OF ORGANIC ORIGIN: ICD-10-CM

## 2022-01-12 DIAGNOSIS — E78.2 MIXED HYPERLIPIDEMIA: ICD-10-CM

## 2022-01-12 DIAGNOSIS — I10 ESSENTIAL HYPERTENSION: Primary | ICD-10-CM

## 2022-01-12 DIAGNOSIS — M51.36 DEGENERATIVE DISC DISEASE, LUMBAR: ICD-10-CM

## 2022-01-12 DIAGNOSIS — E66.09 EXOGENOUS OBESITY: ICD-10-CM

## 2022-01-12 DIAGNOSIS — R73.02 GLUCOSE INTOLERANCE (IMPAIRED GLUCOSE TOLERANCE): ICD-10-CM

## 2022-01-12 DIAGNOSIS — K50.011 CROHN'S DISEASE OF SMALL INTESTINE WITH RECTAL BLEEDING: ICD-10-CM

## 2022-01-12 DIAGNOSIS — Z79.899 HIGH RISK MEDICATION USE: ICD-10-CM

## 2022-01-12 DIAGNOSIS — N40.0 BENIGN PROSTATIC HYPERPLASIA WITHOUT URINARY OBSTRUCTION: ICD-10-CM

## 2022-01-12 PROCEDURE — 99213 OFFICE O/P EST LOW 20 MIN: CPT | Performed by: FAMILY MEDICINE

## 2022-01-12 RX ORDER — IBUPROFEN 800 MG/1
800 TABLET ORAL EVERY 8 HOURS PRN
Qty: 90 TABLET | Refills: 1 | Status: SHIPPED | OUTPATIENT
Start: 2022-01-12 | End: 2022-07-15 | Stop reason: SDUPTHER

## 2022-01-12 RX ORDER — HYDROCODONE BITARTRATE AND ACETAMINOPHEN 5; 325 MG/1; MG/1
1 TABLET ORAL EVERY 6 HOURS PRN
Qty: 40 TABLET | Refills: 0 | Status: SHIPPED | OUTPATIENT
Start: 2022-01-12 | End: 2023-01-18 | Stop reason: SDUPTHER

## 2022-01-12 RX ORDER — LISINOPRIL AND HYDROCHLOROTHIAZIDE 20; 12.5 MG/1; MG/1
1 TABLET ORAL DAILY
Qty: 90 TABLET | Refills: 1 | Status: SHIPPED | OUTPATIENT
Start: 2022-01-12 | End: 2022-01-20 | Stop reason: SDUPTHER

## 2022-01-12 RX ORDER — SILDENAFIL CITRATE 20 MG/1
TABLET ORAL
Qty: 30 TABLET | Refills: 6 | Status: SHIPPED | OUTPATIENT
Start: 2022-01-12 | End: 2022-01-13 | Stop reason: ALTCHOICE

## 2022-01-12 NOTE — PROGRESS NOTES
Subjective   Orlando Friedman is a 56 y.o. male with   Chief Complaint   Patient presents with   • Hypertension     not had labs   • Med Refill   .    History of Present Illness   56-year-old white male with known history of hypertension as well as degenerative disc disease of the lumbar spine and erectile dysfunction here for further medical management.  Current medications include hydrocodone on a very rare basis for his degenerative lumbar spine.  He is also using lisinopril/hydrochlorothiazide at 20/12.5 using 1 tablet daily.  Sildenafil he was used on a as needed basis.  In general medications are used appropriately and are well-tolerated without side effects.  Patient is currently fasting prepared for lab work- has not had fasting labs in quite some time.    The following portions of the patient's history were reviewed and updated as appropriate: allergies, current medications, past family history, past medical history, past social history, past surgical history and problem list.    Review of Systems   Cardiovascular:        Hypertension   Genitourinary:        Erectile dysfunction   Musculoskeletal: Positive for back pain.       Objective     Vitals:    01/12/22 1022   BP: 124/84   Pulse: 74   Temp: 97.3 °F (36.3 °C)   SpO2: 97%       No results found for this or any previous visit (from the past 672 hour(s)).    Physical Exam  Vitals and nursing note reviewed.   Constitutional:       Appearance: Normal appearance. He is well-developed and well-groomed. He is obese.      Comments: Exogenous obesity with a BMI of 36.9   HENT:      Head: Normocephalic and atraumatic.   Neck:      Thyroid: No thyroid mass or thyromegaly.      Vascular: Normal carotid pulses. No carotid bruit.      Trachea: Trachea and phonation normal.   Cardiovascular:      Rate and Rhythm: Normal rate and regular rhythm.      Heart sounds: Normal heart sounds. No murmur heard.  No friction rub. No gallop.    Pulmonary:      Effort: Pulmonary  effort is normal. No respiratory distress.      Breath sounds: Normal breath sounds. No decreased breath sounds, wheezing, rhonchi or rales.   Musculoskeletal:      Cervical back: Neck supple.   Lymphadenopathy:      Cervical: No cervical adenopathy.   Skin:     General: Skin is warm and dry.      Findings: No rash.   Neurological:      Mental Status: He is alert and oriented to person, place, and time.   Psychiatric:         Attention and Perception: Attention and perception normal.         Mood and Affect: Mood and affect normal.         Speech: Speech normal.         Behavior: Behavior normal. Behavior is cooperative.         Thought Content: Thought content normal.         Cognition and Memory: Cognition and memory normal.         Judgment: Judgment normal.         Assessment/Plan   Diagnoses and all orders for this visit:    1. Essential hypertension (Primary)  -     Lipid panel  -     Comprehensive metabolic panel  -     Vitamin D 25 hydroxy  -     TSH  -     PSA DIAGNOSTIC ONLY  -     CBC w AUTO Differential  -     Hemoglobin A1c  -     HYDROcodone-acetaminophen (NORCO) 5-325 MG per tablet; Take 1 tablet by mouth Every 6 (Six) Hours As Needed for Moderate Pain .  Dispense: 40 tablet; Refill: 0  -     lisinopril-hydrochlorothiazide (PRINZIDE,ZESTORETIC) 20-12.5 MG per tablet; Take 1 tablet by mouth Daily.  Dispense: 90 tablet; Refill: 1  -     ibuprofen (ADVIL,MOTRIN) 800 MG tablet; Take 1 tablet by mouth Every 8 (Eight) Hours As Needed for Mild Pain .  Dispense: 90 tablet; Refill: 1    2. Mixed hyperlipidemia  -     Lipid panel  -     Comprehensive metabolic panel  -     Vitamin D 25 hydroxy  -     TSH  -     PSA DIAGNOSTIC ONLY  -     CBC w AUTO Differential  -     Hemoglobin A1c    3. Exogenous obesity  -     Lipid panel  -     Comprehensive metabolic panel  -     Vitamin D 25 hydroxy  -     TSH  -     PSA DIAGNOSTIC ONLY  -     CBC w AUTO Differential  -     Hemoglobin A1c  -     HYDROcodone-acetaminophen  (NORCO) 5-325 MG per tablet; Take 1 tablet by mouth Every 6 (Six) Hours As Needed for Moderate Pain .  Dispense: 40 tablet; Refill: 0  -     lisinopril-hydrochlorothiazide (PRINZIDE,ZESTORETIC) 20-12.5 MG per tablet; Take 1 tablet by mouth Daily.  Dispense: 90 tablet; Refill: 1  -     ibuprofen (ADVIL,MOTRIN) 800 MG tablet; Take 1 tablet by mouth Every 8 (Eight) Hours As Needed for Mild Pain .  Dispense: 90 tablet; Refill: 1    4. Vitamin D deficiency  -     Lipid panel  -     Comprehensive metabolic panel  -     Vitamin D 25 hydroxy  -     TSH  -     PSA DIAGNOSTIC ONLY  -     CBC w AUTO Differential  -     Hemoglobin A1c  -     HYDROcodone-acetaminophen (NORCO) 5-325 MG per tablet; Take 1 tablet by mouth Every 6 (Six) Hours As Needed for Moderate Pain .  Dispense: 40 tablet; Refill: 0  -     lisinopril-hydrochlorothiazide (PRINZIDE,ZESTORETIC) 20-12.5 MG per tablet; Take 1 tablet by mouth Daily.  Dispense: 90 tablet; Refill: 1  -     ibuprofen (ADVIL,MOTRIN) 800 MG tablet; Take 1 tablet by mouth Every 8 (Eight) Hours As Needed for Mild Pain .  Dispense: 90 tablet; Refill: 1    5. Crohn's disease of small intestine with rectal bleeding (HCC)  -     Lipid panel  -     Comprehensive metabolic panel  -     Vitamin D 25 hydroxy  -     TSH  -     PSA DIAGNOSTIC ONLY  -     CBC w AUTO Differential  -     Hemoglobin A1c  -     HYDROcodone-acetaminophen (NORCO) 5-325 MG per tablet; Take 1 tablet by mouth Every 6 (Six) Hours As Needed for Moderate Pain .  Dispense: 40 tablet; Refill: 0  -     lisinopril-hydrochlorothiazide (PRINZIDE,ZESTORETIC) 20-12.5 MG per tablet; Take 1 tablet by mouth Daily.  Dispense: 90 tablet; Refill: 1  -     ibuprofen (ADVIL,MOTRIN) 800 MG tablet; Take 1 tablet by mouth Every 8 (Eight) Hours As Needed for Mild Pain .  Dispense: 90 tablet; Refill: 1    6. Benign prostatic hypertrophy without urinary obstruction  -     Lipid panel  -     Comprehensive metabolic panel  -     Vitamin D 25 hydroxy  -      TSH  -     PSA DIAGNOSTIC ONLY  -     CBC w AUTO Differential  -     Hemoglobin A1c  -     HYDROcodone-acetaminophen (NORCO) 5-325 MG per tablet; Take 1 tablet by mouth Every 6 (Six) Hours As Needed for Moderate Pain .  Dispense: 40 tablet; Refill: 0  -     lisinopril-hydrochlorothiazide (PRINZIDE,ZESTORETIC) 20-12.5 MG per tablet; Take 1 tablet by mouth Daily.  Dispense: 90 tablet; Refill: 1  -     ibuprofen (ADVIL,MOTRIN) 800 MG tablet; Take 1 tablet by mouth Every 8 (Eight) Hours As Needed for Mild Pain .  Dispense: 90 tablet; Refill: 1    7. Glucose intolerance (impaired glucose tolerance)  -     Lipid panel  -     Comprehensive metabolic panel  -     Vitamin D 25 hydroxy  -     TSH  -     PSA DIAGNOSTIC ONLY  -     CBC w AUTO Differential  -     Hemoglobin A1c    8. Degenerative disc disease, lumbar  -     HYDROcodone-acetaminophen (NORCO) 5-325 MG per tablet; Take 1 tablet by mouth Every 6 (Six) Hours As Needed for Moderate Pain .  Dispense: 40 tablet; Refill: 0  -     lisinopril-hydrochlorothiazide (PRINZIDE,ZESTORETIC) 20-12.5 MG per tablet; Take 1 tablet by mouth Daily.  Dispense: 90 tablet; Refill: 1  -     ibuprofen (ADVIL,MOTRIN) 800 MG tablet; Take 1 tablet by mouth Every 8 (Eight) Hours As Needed for Mild Pain .  Dispense: 90 tablet; Refill: 1    9. High risk medication use  -     HYDROcodone-acetaminophen (NORCO) 5-325 MG per tablet; Take 1 tablet by mouth Every 6 (Six) Hours As Needed for Moderate Pain .  Dispense: 40 tablet; Refill: 0  -     lisinopril-hydrochlorothiazide (PRINZIDE,ZESTORETIC) 20-12.5 MG per tablet; Take 1 tablet by mouth Daily.  Dispense: 90 tablet; Refill: 1  -     ibuprofen (ADVIL,MOTRIN) 800 MG tablet; Take 1 tablet by mouth Every 8 (Eight) Hours As Needed for Mild Pain .  Dispense: 90 tablet; Refill: 1    10. Crohn's disease of large intestine with rectal bleeding (HCC)  -     HYDROcodone-acetaminophen (NORCO) 5-325 MG per tablet; Take 1 tablet by mouth Every 6 (Six) Hours As  Needed for Moderate Pain .  Dispense: 40 tablet; Refill: 0  -     lisinopril-hydrochlorothiazide (PRINZIDE,ZESTORETIC) 20-12.5 MG per tablet; Take 1 tablet by mouth Daily.  Dispense: 90 tablet; Refill: 1  -     ibuprofen (ADVIL,MOTRIN) 800 MG tablet; Take 1 tablet by mouth Every 8 (Eight) Hours As Needed for Mild Pain .  Dispense: 90 tablet; Refill: 1    11. Impotence of organic origin  -     HYDROcodone-acetaminophen (NORCO) 5-325 MG per tablet; Take 1 tablet by mouth Every 6 (Six) Hours As Needed for Moderate Pain .  Dispense: 40 tablet; Refill: 0  -     lisinopril-hydrochlorothiazide (PRINZIDE,ZESTORETIC) 20-12.5 MG per tablet; Take 1 tablet by mouth Daily.  Dispense: 90 tablet; Refill: 1  -     sildenafil (REVATIO) 20 MG tablet; 2-5 po 1 hour before intercours  Dispense: 30 tablet; Refill: 6  -     ibuprofen (ADVIL,MOTRIN) 800 MG tablet; Take 1 tablet by mouth Every 8 (Eight) Hours As Needed for Mild Pain .  Dispense: 90 tablet; Refill: 1        Return in about 6 months (around 7/12/2022) for Recheck.

## 2022-01-13 DIAGNOSIS — N52.9 IMPOTENCE OF ORGANIC ORIGIN: Primary | ICD-10-CM

## 2022-01-13 LAB
25(OH)D3+25(OH)D2 SERPL-MCNC: 22.8 NG/ML (ref 30–100)
ALBUMIN SERPL-MCNC: 4.4 G/DL (ref 3.8–4.9)
ALBUMIN/GLOB SERPL: 1.7 {RATIO} (ref 1.2–2.2)
ALP SERPL-CCNC: 62 IU/L (ref 44–121)
ALT SERPL-CCNC: 33 IU/L (ref 0–44)
AST SERPL-CCNC: 22 IU/L (ref 0–40)
BASOPHILS # BLD AUTO: 0 X10E3/UL (ref 0–0.2)
BASOPHILS NFR BLD AUTO: 1 %
BILIRUB SERPL-MCNC: 0.4 MG/DL (ref 0–1.2)
BUN SERPL-MCNC: 16 MG/DL (ref 6–24)
BUN/CREAT SERPL: 18 (ref 9–20)
CALCIUM SERPL-MCNC: 9.3 MG/DL (ref 8.7–10.2)
CHLORIDE SERPL-SCNC: 104 MMOL/L (ref 96–106)
CHOLEST SERPL-MCNC: 227 MG/DL (ref 100–199)
CO2 SERPL-SCNC: 26 MMOL/L (ref 20–29)
CREAT SERPL-MCNC: 0.87 MG/DL (ref 0.76–1.27)
EOSINOPHIL # BLD AUTO: 0.2 X10E3/UL (ref 0–0.4)
EOSINOPHIL NFR BLD AUTO: 4 %
ERYTHROCYTE [DISTWIDTH] IN BLOOD BY AUTOMATED COUNT: 12.7 % (ref 11.6–15.4)
GLOBULIN SER CALC-MCNC: 2.6 G/DL (ref 1.5–4.5)
GLUCOSE SERPL-MCNC: 103 MG/DL (ref 65–99)
HBA1C MFR BLD: 5.7 % (ref 4.8–5.6)
HCT VFR BLD AUTO: 42.1 % (ref 37.5–51)
HDLC SERPL-MCNC: 37 MG/DL
HGB BLD-MCNC: 14.4 G/DL (ref 13–17.7)
IMM GRANULOCYTES # BLD AUTO: 0 X10E3/UL (ref 0–0.1)
IMM GRANULOCYTES NFR BLD AUTO: 0 %
LDLC SERPL CALC-MCNC: 153 MG/DL (ref 0–99)
LYMPHOCYTES # BLD AUTO: 2.3 X10E3/UL (ref 0.7–3.1)
LYMPHOCYTES NFR BLD AUTO: 40 %
MCH RBC QN AUTO: 29.4 PG (ref 26.6–33)
MCHC RBC AUTO-ENTMCNC: 34.2 G/DL (ref 31.5–35.7)
MCV RBC AUTO: 86 FL (ref 79–97)
MONOCYTES # BLD AUTO: 0.4 X10E3/UL (ref 0.1–0.9)
MONOCYTES NFR BLD AUTO: 6 %
NEUTROPHILS # BLD AUTO: 2.8 X10E3/UL (ref 1.4–7)
NEUTROPHILS NFR BLD AUTO: 49 %
PLATELET # BLD AUTO: 279 X10E3/UL (ref 150–450)
POTASSIUM SERPL-SCNC: 4.9 MMOL/L (ref 3.5–5.2)
PROT SERPL-MCNC: 7 G/DL (ref 6–8.5)
PSA SERPL-MCNC: 2.9 NG/ML (ref 0–4)
RBC # BLD AUTO: 4.89 X10E6/UL (ref 4.14–5.8)
SODIUM SERPL-SCNC: 143 MMOL/L (ref 134–144)
TRIGL SERPL-MCNC: 199 MG/DL (ref 0–149)
TSH SERPL DL<=0.005 MIU/L-ACNC: 1.59 UIU/ML (ref 0.45–4.5)
VLDLC SERPL CALC-MCNC: 37 MG/DL (ref 5–40)
WBC # BLD AUTO: 5.7 X10E3/UL (ref 3.4–10.8)

## 2022-01-13 RX ORDER — TADALAFIL 10 MG/1
TABLET ORAL
Qty: 30 TABLET | Refills: 5 | Status: SHIPPED | OUTPATIENT
Start: 2022-01-13 | End: 2023-01-18

## 2022-01-13 NOTE — TELEPHONE ENCOUNTER
Tell him I sent the Cialis to [facility] in [place].  Tell him to use the GENELINK humza and it would be much cheaper for him at [facility] using that after that it would be anywhere else.

## 2022-01-19 ENCOUNTER — TELEPHONE (OUTPATIENT)
Dept: GASTROENTEROLOGY | Facility: CLINIC | Age: 57
End: 2022-01-19

## 2022-01-19 DIAGNOSIS — K50.811 CROHN'S DISEASE OF BOTH SMALL AND LARGE INTESTINE WITH RECTAL BLEEDING: Primary | ICD-10-CM

## 2022-01-19 NOTE — TELEPHONE ENCOUNTER
Agnieszka from [facility] emailed me stating that she tried to submit a PA renewal on this patient and it was denied. The denial stated that the insurance requires confirmed clinical documentation that the patient has improved or is stable on Entyvio. Patient was last seen 02/2021.

## 2022-01-19 NOTE — TELEPHONE ENCOUNTER
CERTIFICATE OF WORK      July 6, 2020      RE:   Felicia Quiroz  [address] [facility] [address]    To whom it may concern:    This is to certify that Felicia Quiroz has been under Barbie Peña PA-C's care from 7/6/2020 and is excused from work on 7/6/2020 - 7/27/2020. Felicia will be re-evaluated on 7/27/2020.      REMARKS: If you have any questions or concerns please contact my office to discuss.       SIGNATURE:___________________________________________,   7/6/2020  Miranda Nguyen MA/Barbie Peña PA-C   [facility], [address]  [address] [facility] [address]  [phone]   Labs placed.  Msg sent to Dr Herbert to cosign.       Called pt and left detailed msg on identified vm to have labs drawn at [facility] outpatient lab or any [facility].  Advised to call back if any questions.

## 2022-01-19 NOTE — TELEPHONE ENCOUNTER
PT returned call. Has questions regarding labs. PT states he just had blood drawn last week for  pcp and wants to know if he needs to do it again. PT requesting call back [phone]

## 2022-01-19 NOTE — TELEPHONE ENCOUNTER
Message sent to the RN's to place lab orders for the following   Hep B Surface Antigen (Last done 07/2020)  TB Gold (Last done 07/2020)  Hep B Surface Antibody (Never drawn)    Patient must have these labs drawn yearly.   Patient needs office visit scheduled asap and labs drawn/resulted so that we can provide the insurance the office visit note to move forward with PA.

## 2022-01-20 DIAGNOSIS — E66.09 EXOGENOUS OBESITY: ICD-10-CM

## 2022-01-20 DIAGNOSIS — N40.0 BENIGN PROSTATIC HYPERPLASIA WITHOUT URINARY OBSTRUCTION: ICD-10-CM

## 2022-01-20 DIAGNOSIS — E55.9 VITAMIN D DEFICIENCY: ICD-10-CM

## 2022-01-20 DIAGNOSIS — I10 ESSENTIAL HYPERTENSION: ICD-10-CM

## 2022-01-20 DIAGNOSIS — K50.011 CROHN'S DISEASE OF SMALL INTESTINE WITH RECTAL BLEEDING: ICD-10-CM

## 2022-01-20 DIAGNOSIS — Z79.899 HIGH RISK MEDICATION USE: ICD-10-CM

## 2022-01-20 DIAGNOSIS — K50.111 CROHN'S DISEASE OF LARGE INTESTINE WITH RECTAL BLEEDING: ICD-10-CM

## 2022-01-20 DIAGNOSIS — M51.36 DEGENERATIVE DISC DISEASE, LUMBAR: ICD-10-CM

## 2022-01-20 DIAGNOSIS — N52.9 IMPOTENCE OF ORGANIC ORIGIN: ICD-10-CM

## 2022-01-20 RX ORDER — LISINOPRIL AND HYDROCHLOROTHIAZIDE 20; 12.5 MG/1; MG/1
1 TABLET ORAL DAILY
Qty: 90 TABLET | Refills: 1 | Status: SHIPPED | OUTPATIENT
Start: 2022-01-20 | End: 2023-01-18 | Stop reason: SDUPTHER

## 2022-01-20 NOTE — TELEPHONE ENCOUNTER
Letter completed and Jerica has signed the document. Faxed to Agnieszka at [facility] and they will submit the PA again with the letter for Entyvio.

## 2022-01-20 NOTE — TELEPHONE ENCOUNTER
Called and spoke with patient wife Ofelia. I explained to her that the patient needs to have his labs completed and a yearly office visit scheduled for the insurance to consider approving the Entyvio PA. I explained to Ofelia that I have spoken with Agnieszka at [facility] and we went over the denial together. The current PA had been approved until the end of Feb 2022 but the patient group number and member ID had changed so a new PA was required. Patient still has [employer] but his member ID is no [id number] and group number is now [id number]. The denial stated that insurance is requesting clinical documentation that the patient is stable or approving on Entyvio. I asked Agnieszka if a letter from the provider would possible work until we can get the patient in for an appointment. Agnieszka stated that the letter should work. I told Ofelia that the patient needed to be scheduled for a one year follow up and still needed his labs done. I told Ofelia that I am going to consult with MAURI Bolivar to see if she can compose a letter to the insurance with the required documentation needed. I advised Ofelia that if agreeable I would submit that letter to Ofelia as soon as Jerica has completed it. Patient was due for his Entyvio infusion on 01/12/2022. Ofelia was agreeable to scheduling the appointment for her  and stated that he is having the labs drawn today through [facility].  Ofelia was transferred to [facility] to make the appointment. Patient is scheduled for a one year follow up on 02/03/2022 with MAURI Bolivar.

## 2022-01-20 NOTE — TELEPHONE ENCOUNTER
Can you please compose a letter in Epic for this patients insurance with the following information..    Must have today's date  Patient name and   Letter must state what the patients DX is and that the patient has either improved or is stable on his Entyvio. Please add any clinical information that you can to support this diagnosis and support that he is stable or improving on the Entyvio  Please include that patient was last seen for an office visit on 2021 and is scheduled for his next follow up office visit appointment with you on 2022.     Patient was due for his Entyvio infusion on 2022 and can not receive it until we submit this letter to the insurance company.       Side note... Patient is to have his yearly Hep B Surface Antibody/Antigen and TB Gold labs drawn today per patient wife.    Is there anyway that you could please have this letter ready and signed before the end of the day so I can submit to the insurance company ASAP? Thank you so very much.

## 2022-01-20 NOTE — TELEPHONE ENCOUNTER
I will consult with MAURI Bolivar today to see if she can write a letter on behalf of the patient to submit to the insurance

## 2022-01-20 NOTE — TELEPHONE ENCOUNTER
Received an email from Agnieszka with [facility] and she stated that the PA for Entyvio has just been approved for the patient. Called and spoke with Ofelia and informed her that the PA was approved. Ofelia expressed understanding.

## 2022-01-20 NOTE — TELEPHONE ENCOUNTER
Pts wife called to request his prescriptions be sent to [facility] in [place] instead of [facility] in [place].    Except for the Cialis, it can remain at [facility].    Resent his Lisinopril-HCTZ

## 2022-01-21 LAB
HBV SURFACE AB SER QL: REACTIVE
HBV SURFACE AG SERPL QL IA: NEGATIVE

## 2022-01-22 LAB
GAMMA INTERFERON BACKGROUND BLD IA-ACNC: 0.1 IU/ML
M TB IFN-G BLD-IMP: NEGATIVE
M TB IFN-G CD4+ BCKGRND COR BLD-ACNC: 0.13 IU/ML
M TB IFN-G CD4+CD8+ BCKGRND COR BLD-ACNC: 0.1 IU/ML
MITOGEN IGNF BLD-ACNC: >10 IU/ML
QUANTIFERON INCUBATION: NORMAL
SERVICE CMNT-IMP: NORMAL

## 2022-01-31 ENCOUNTER — TELEPHONE (OUTPATIENT)
Dept: GASTROENTEROLOGY | Facility: CLINIC | Age: 57
End: 2022-01-31

## 2022-03-04 ENCOUNTER — TELEPHONE (OUTPATIENT)
Dept: GASTROENTEROLOGY | Facility: CLINIC | Age: 57
End: 2022-03-04

## 2022-03-04 NOTE — TELEPHONE ENCOUNTER
Patient called. He requested that this RN call his wife to schedule his follow up appointment.   Spouse called, left VM requesting call back.

## 2022-03-22 ENCOUNTER — OFFICE VISIT (OUTPATIENT)
Dept: GASTROENTEROLOGY | Facility: CLINIC | Age: 57
End: 2022-03-22

## 2022-03-22 VITALS — HEIGHT: 72 IN | WEIGHT: 278.5 LBS | BODY MASS INDEX: 37.72 KG/M2 | TEMPERATURE: 96.6 F

## 2022-03-22 DIAGNOSIS — Z92.89 H/O OF BIOLOGICAL THERAPY TREATMENT: ICD-10-CM

## 2022-03-22 DIAGNOSIS — K50.811 CROHN'S DISEASE OF BOTH SMALL AND LARGE INTESTINE WITH RECTAL BLEEDING: Primary | ICD-10-CM

## 2022-03-22 PROCEDURE — 99213 OFFICE O/P EST LOW 20 MIN: CPT | Performed by: NURSE PRACTITIONER

## 2022-03-22 NOTE — PROGRESS NOTES
Chief Complaint   Patient presents with   • Crohn's Disease       HPI    Orlando Friedman is a  57 y.o. male here for a follow up visit for Crohn's disease of the large and small bowel.    This patient follows Dr. Herbert and myself.    Transitioned from Humira to Entyvio after treatment failure 1 year ago.  Patient with normal labs in the time but found to have active disease on endoscopy.    Today he reports feeling quite well on Entyvio infusions every 8 weeks.  No abdominal pain, diarrhea, rectal bleeding reported today.  Appetite is good.  His weight is stable.    No upper GI complaints.    Denies extraintestinal manifestations of Crohn's disease.    Recent TB Gold, hepatitis B surface antigen negative.  Labs in January with normal CBC and normal CMP.    Past Medical History:   Diagnosis Date   • Back pain    • Benign prostatic hypertrophy without urinary obstruction 3/30/2017   • Colitis    • Crohn's disease (HCC)    • Degenerative disc disease, lumbar 3/30/2017   • Hyperlipidemia 3/30/2017   • Impotence of organic origin 3/30/2017   • Sebaceous cyst    • Vitamin D deficiency 3/30/2017       Past Surgical History:   Procedure Laterality Date   • COLONOSCOPY  2013 approx    Crohns per patiient   • COLONOSCOPY N/A 5/31/2017    One 3 mm polyp in the cecum,.  PATH: MILD POLYPOID HYPERPLASIA, CHRONIC ACTIVE INFLAMMATION, MODERATE TO SEVERE   • COLONOSCOPY N/A 8/26/2020    Procedure: COLONOSCOPY with biopsies;  Surgeon: Erwin Herbert MD;  Location: [facility] ENDOSCOPY;  Service: Gastroenterology;  Laterality: N/A;  pre- hx polyps, hx chron's  post-- transverse lipoma, chron's colitis   • CYST REMOVAL N/A    • WISDOM TOOTH EXTRACTION         Scheduled Meds:     Continuous Infusions: No current facility-administered medications for this visit.      PRN Meds:     No Known Allergies    Social History     Socioeconomic History   • Marital status:    Tobacco Use   • Smoking status: Former Smoker     Packs/day: 1.50      Years: 10.00     Pack years: 15.00     Types: Cigarettes     Quit date:      Years since quittin.2   • Smokeless tobacco: Never Used   • Tobacco comment: CAFFEINE USE: ONE CUP DAILY.   Substance and Sexual Activity   • Alcohol use: Yes     Alcohol/week: 10.0 standard drinks     Types: 10 Cans of beer per week   • Drug use: No   • Sexual activity: Defer       Family History   Problem Relation Age of Onset   • Breast cancer Sister    • Sudden death Father 68   • Malig Hyperthermia Neg Hx        Review of Systems   Constitutional: Negative for activity change, appetite change, fatigue, fever and unexpected weight change.   HENT: Negative for trouble swallowing.    Respiratory: Negative for apnea, cough, choking, chest tightness, shortness of breath and wheezing.    Cardiovascular: Negative for chest pain, palpitations and leg swelling.   Gastrointestinal: Negative for abdominal distention, abdominal pain, anal bleeding, blood in stool, constipation, diarrhea, nausea, rectal pain and vomiting.       Vitals:    22 0904   Temp: 96.6 °F (35.9 °C)       Physical Exam  Constitutional:       Appearance: He is well-developed.   Abdominal:      General: Bowel sounds are normal. There is no distension.      Palpations: Abdomen is soft. There is no mass.      Tenderness: There is no abdominal tenderness. There is no guarding.      Hernia: No hernia is present.   Skin:     General: Skin is warm and dry.      Capillary Refill: Capillary refill takes less than 2 seconds.   Neurological:      Mental Status: He is alert and oriented to person, place, and time.   Psychiatric:         Behavior: Behavior normal.     Assessment    Diagnoses and all orders for this visit:    1. Crohn's disease of both small and large intestine with rectal bleeding (HCC) (Primary)    2. H/O of biological therapy treatment       Plan    Orlando seems to be in remission on Entyvio asymptomatic on visit today.  Continue q. 8-week  infusions.    We did discuss that immunosuppressive therapy puts the patient at higher risk for infection; patient is aware and will take appropriate precautions. I did  on staying updated on vaccines, including annual flu shot. The patient is also aware of the rare but increased risk of malignancy, such as lymphoma and skin cancer, with biologic or immunosuppressive therapy as well. I also counseled the patient to follow with a dermatologist annually and sunscreen use.     I will discuss timing for follow-up endoscopic examination further with Dr. Herbert.    Return to clinic 6 months.           MAURI Ford  [facility]  [address] [place] [address]  Office: [phone]

## 2022-03-23 ENCOUNTER — TELEPHONE (OUTPATIENT)
Dept: GASTROENTEROLOGY | Facility: CLINIC | Age: 57
End: 2022-03-23

## 2022-03-23 NOTE — TELEPHONE ENCOUNTER
C/s recall placed for 8/26/2025    Left recall date on pts vm asking him to call back if he has any questions.  Pt identified himself on his vm

## 2022-03-23 NOTE — TELEPHONE ENCOUNTER
----- Message from MAURI Ford sent at 3/22/2022 12:34 PM EDT -----  Regarding: Scope follow-up  Please inform the patient that Dr. Herbert states he is due for follow-up colonoscopy in 2025.  Please place recall.    ----- Message -----  From: Erwin Herbert MD  Sent: 3/22/2022  12:30 PM EDT  To: MAURI Ford    Not to see if they are doing well.  If clinically unsure if improving will go to check more on routine surveillance.  ----- Message -----  From: Jerica Bolivar APRN  Sent: 3/22/2022   9:25 AM EDT  To: Erwin Herbert MD    Patient doing fantastic on Entyvio.  His last colonoscopy was 2020 with active disease (on humira).  Do you ever perform a follow-up colonoscopy to verify endoscopic remission?

## 2022-07-15 DIAGNOSIS — K50.011 CROHN'S DISEASE OF SMALL INTESTINE WITH RECTAL BLEEDING: ICD-10-CM

## 2022-07-15 DIAGNOSIS — Z79.899 HIGH RISK MEDICATION USE: ICD-10-CM

## 2022-07-15 DIAGNOSIS — N40.0 BENIGN PROSTATIC HYPERPLASIA WITHOUT URINARY OBSTRUCTION: ICD-10-CM

## 2022-07-15 DIAGNOSIS — N52.9 IMPOTENCE OF ORGANIC ORIGIN: ICD-10-CM

## 2022-07-15 DIAGNOSIS — E66.09 EXOGENOUS OBESITY: ICD-10-CM

## 2022-07-15 DIAGNOSIS — M51.36 DEGENERATIVE DISC DISEASE, LUMBAR: ICD-10-CM

## 2022-07-15 DIAGNOSIS — K50.111 CROHN'S DISEASE OF LARGE INTESTINE WITH RECTAL BLEEDING: ICD-10-CM

## 2022-07-15 DIAGNOSIS — I10 ESSENTIAL HYPERTENSION: ICD-10-CM

## 2022-07-15 DIAGNOSIS — E55.9 VITAMIN D DEFICIENCY: ICD-10-CM

## 2022-07-15 RX ORDER — IBUPROFEN 800 MG/1
800 TABLET ORAL EVERY 8 HOURS PRN
Qty: 30 TABLET | Refills: 0 | Status: SHIPPED | OUTPATIENT
Start: 2022-07-15 | End: 2023-01-18 | Stop reason: SDUPTHER

## 2022-07-15 NOTE — TELEPHONE ENCOUNTER
Caller: Ofelia Friedman    Relationship: Emergency Contact    Best call back number: [phone]    Requested Prescriptions:   Requested Prescriptions     Pending Prescriptions Disp Refills   • ibuprofen (ADVIL,MOTRIN) 800 MG tablet 90 tablet 1     Sig: Take 1 tablet by mouth Every 8 (Eight) Hours As Needed for Mild Pain .        Pharmacy where request should be sent: [facility] [address] [place] [address] PKY - [phone]  - [phone] FX     Additional details provided by patient:     Does the patient have less than a 3 day supply:  [] Yes  [x] No    Jim Yan Rep   07/15/22 14:33 EDT

## 2022-09-27 ENCOUNTER — OFFICE VISIT (OUTPATIENT)
Dept: SURGERY | Facility: CLINIC | Age: 57
End: 2022-09-27

## 2022-09-27 VITALS — BODY MASS INDEX: 37.25 KG/M2 | WEIGHT: 275 LBS | HEIGHT: 72 IN

## 2022-09-27 DIAGNOSIS — M79.89 SOFT TISSUE MASS: Primary | ICD-10-CM

## 2022-09-27 PROCEDURE — 99213 OFFICE O/P EST LOW 20 MIN: CPT | Performed by: STUDENT IN AN ORGANIZED HEALTH CARE EDUCATION/TRAINING PROGRAM

## 2022-09-29 ENCOUNTER — TELEPHONE (OUTPATIENT)
Dept: SURGERY | Facility: CLINIC | Age: 57
End: 2022-09-29

## 2022-09-29 NOTE — TELEPHONE ENCOUNTER
Patient's wife called about setting up appointment to have cyst removed. Is this supposed to be done in office or in the OR she believes it is supposed to in the OR. Can orders be placed or do I need to make an office appointment?

## 2022-10-04 ENCOUNTER — PREP FOR SURGERY (OUTPATIENT)
Dept: OTHER | Facility: HOSPITAL | Age: 57
End: 2022-10-04

## 2022-10-04 ENCOUNTER — TELEPHONE (OUTPATIENT)
Dept: SURGERY | Facility: CLINIC | Age: 57
End: 2022-10-04

## 2022-10-04 DIAGNOSIS — M79.89 SOFT TISSUE MASS: Primary | ICD-10-CM

## 2022-10-04 RX ORDER — CEFAZOLIN SODIUM 2 G/100ML
2 INJECTION, SOLUTION INTRAVENOUS ONCE
Status: CANCELLED | OUTPATIENT
Start: 2022-10-19 | End: 2022-10-04

## 2022-10-04 NOTE — TELEPHONE ENCOUNTER
PT'S WIFE, LENNIE CALLED IN REGARDS TO HAVE THE CYSTS REMOVED FROM THE PT'S BACK. WILL SEND DR CARDOSO ANOTHER MESSAGE.

## 2022-10-18 ENCOUNTER — PRE-ADMISSION TESTING (OUTPATIENT)
Dept: PREADMISSION TESTING | Facility: HOSPITAL | Age: 57
End: 2022-10-18

## 2022-10-18 VITALS
BODY MASS INDEX: 37.88 KG/M2 | DIASTOLIC BLOOD PRESSURE: 82 MMHG | SYSTOLIC BLOOD PRESSURE: 166 MMHG | TEMPERATURE: 98 F | HEIGHT: 72 IN | HEART RATE: 74 BPM | OXYGEN SATURATION: 98 % | RESPIRATION RATE: 16 BRPM | WEIGHT: 279.7 LBS

## 2022-10-18 LAB
ANION GAP SERPL CALCULATED.3IONS-SCNC: 10.3 MMOL/L (ref 5–15)
BUN SERPL-MCNC: 14 MG/DL (ref 6–20)
BUN/CREAT SERPL: 18.4 (ref 7–25)
CALCIUM SPEC-SCNC: 9.5 MG/DL (ref 8.6–10.5)
CHLORIDE SERPL-SCNC: 103 MMOL/L (ref 98–107)
CO2 SERPL-SCNC: 26.7 MMOL/L (ref 22–29)
CREAT SERPL-MCNC: 0.76 MG/DL (ref 0.76–1.27)
DEPRECATED RDW RBC AUTO: 40.8 FL (ref 37–54)
EGFRCR SERPLBLD CKD-EPI 2021: 106.1 ML/MIN/1.73
ERYTHROCYTE [DISTWIDTH] IN BLOOD BY AUTOMATED COUNT: 12.5 % (ref 12.3–15.4)
GLUCOSE SERPL-MCNC: 110 MG/DL (ref 65–99)
HCT VFR BLD AUTO: 41.2 % (ref 37.5–51)
HGB BLD-MCNC: 13.8 G/DL (ref 13–17.7)
MCH RBC QN AUTO: 29.7 PG (ref 26.6–33)
MCHC RBC AUTO-ENTMCNC: 33.5 G/DL (ref 31.5–35.7)
MCV RBC AUTO: 88.6 FL (ref 79–97)
PLATELET # BLD AUTO: 238 10*3/MM3 (ref 140–450)
PMV BLD AUTO: 10.3 FL (ref 6–12)
POTASSIUM SERPL-SCNC: 4.5 MMOL/L (ref 3.5–5.2)
RBC # BLD AUTO: 4.65 10*6/MM3 (ref 4.14–5.8)
SODIUM SERPL-SCNC: 140 MMOL/L (ref 136–145)
WBC NRBC COR # BLD: 5.65 10*3/MM3 (ref 3.4–10.8)

## 2022-10-18 PROCEDURE — 80048 BASIC METABOLIC PNL TOTAL CA: CPT

## 2022-10-18 PROCEDURE — 85027 COMPLETE CBC AUTOMATED: CPT

## 2022-10-18 PROCEDURE — 36415 COLL VENOUS BLD VENIPUNCTURE: CPT

## 2022-10-18 RX ORDER — CHLORHEXIDINE GLUCONATE 500 MG/1
CLOTH TOPICAL
COMMUNITY
End: 2023-01-18

## 2022-10-18 NOTE — DISCHARGE INSTRUCTIONS
Take the following medications the morning of surgery:  NONE    ARRIVE FOR SURGERY AT 11:30 AM      If you are on prescription narcotic pain medication to control your pain you may also take that medication the morning of surgery.    General Instructions:  Do not eat solid food after midnight the night before surgery.  You may drink clear liquids day of surgery but must stop at least one hour before your hospital arrival time.  It is beneficial for you to have a clear drink that contains carbohydrates the day of surgery.  We suggest a 12 to 20 ounce bottle of Gatorade or Powerade for non-diabetic patients or a 12 to 20 ounce bottle of G2 or Powerade Zero for diabetic patients. (Pediatric patients, are not advised to drink a 12 to 20 ounce carbohydrate drink)    Clear liquids are liquids you can see through.  Nothing red in color.     Plain water                               Sports drinks  Sodas                                   Gelatin (Jell-O)  Fruit juices without pulp such as white grape juice and apple juice  Popsicles that contain no fruit or yogurt  Tea or coffee (no cream or milk added)  Gatorade / Powerade  G2 / Powerade Zero    Infants may have breast milk up to four hours before surgery.  Infants drinking formula may drink formula up to six hours before surgery.   Patients who avoid smoking, chewing tobacco and alcohol for 4 weeks prior to surgery have a reduced risk of post-operative complications.  Quit smoking as many days before surgery as you can.  Do not smoke, use chewing tobacco or drink alcohol the day of surgery.   If applicable bring your C-PAP/ BI-PAP machine.  Bring any papers given to you in the doctor’s office.  Wear clean comfortable clothes.  Do not wear contact lenses, false eyelashes or make-up.  Bring a case for your glasses.   Bring crutches or walker if applicable.  Remove all piercings.  Leave jewelry and any other valuables at home.  Hair extensions with metal clips must be removed  prior to surgery.  The Pre-Admission Testing nurse will instruct you to bring medications if unable to obtain an accurate list in Pre-Admission Testing.        If you were given a blood bank ID arm band remember to bring it with you the day of surgery.    Preventing a Surgical Site Infection:  For 2 to 3 days before surgery, avoid shaving with a razor because the razor can irritate skin and make it easier to develop an infection.    Any areas of open skin can increase the risk of a post-operative wound infection by allowing bacteria to enter and travel throughout the body.  Notify your surgeon if you have any skin wounds / rashes even if it is not near the expected surgical site.  The area will need assessed to determine if surgery should be delayed until it is healed.  The night prior to surgery shower using a fresh bar of anti-bacterial soap (such as Dial) and clean washcloth.  Sleep in a clean bed with clean clothing.  Do not allow pets to sleep with you.  Shower on the morning of surgery using a fresh bar of anti-bacterial soap (such as Dial) and clean washcloth.  Dry with a clean towel and dress in clean clothing.  Ask your surgeon if you will be receiving antibiotics prior to surgery.  Make sure you, your family, and all healthcare providers clean their hands with soap and water or an alcohol based hand  before caring for you or your wound.    CHLORHEXIDINE CLOTH INSTRUCTIONS  The morning of surgery follow these instructions using the Chlorhexidine cloths you've been given.  These steps reduce bacteria on the body.  Do not use the cloths near your eyes, ears mouth, genitalia or on open wounds.  Throw the cloths away after use but do not try to flush them down a toilet.      Open and remove one cloth at a time from the package.    Leave the cloth unfolded and begin the bathing.  Massage the skin with the cloths using gentle pressure to remove bacteria.  Do not scrub harshly.   Follow the steps below  with one 2% CHG cloth per area (6 total cloths).  One cloth for neck, shoulders and chest.  One cloth for both arms, hands, fingers and underarms (do underarms last).  One cloth for the abdomen followed by groin.  One cloth for right leg and foot including between the toes.  One cloth for left leg and foot including between the toes.  The last cloth is to be used for the back of the neck, back and buttocks.    Allow the CHG to air dry 3 minutes on the skin which will give it time to work and decrease the chance of irritation.  The skin may feel sticky until it is dry.  Do not rinse with water or any other liquid or you will lose the beneficial effects of the CHG.  If mild skin irritation occurs, do rinse the skin to remove the CHG.  Report this to the nurse at time of admission.  Do not apply lotions, creams, ointments, deodorants or perfumes after using the clothes. Dress in clean clothes before coming to the hospital.     Day of surgery:  Your arrival time is approximately two hours before your scheduled surgery time.  Upon arrival, a Pre-op nurse and Anesthesiologist will review your health history, obtain vital signs, and answer questions you may have.  The only belongings needed at this time will be a list of your home medications and if applicable your C-PAP/BI-PAP machine.  A Pre-op nurse will start an IV and you may receive medication in preparation for surgery, including something to help you relax.     Please be aware that surgery does come with discomfort.  We want to make every effort to control your discomfort so please discuss any uncontrolled symptoms with your nurse.   Your doctor will most likely have prescribed pain medications.      If you are going home after surgery you will receive individualized written care instructions before being discharged.  A responsible adult must drive you to and from the hospital on the day of your surgery and stay with you for 24 hours.  Discharge prescriptions can  be filled by the hospital pharmacy during regular pharmacy hours.  If you are having surgery late in the day/evening your prescription may be e-prescribed to your pharmacy.  Please verify your pharmacy hours or chose a 24 hour pharmacy to avoid not having access to your prescription because your pharmacy has closed for the day.    If you are staying overnight following surgery, you will be transported to your hospital room following the recovery period.  [facility] has all private rooms.    If you have any questions please call Pre-Admission Testing at [phone].  Deductibles and co-payments are collected on the day of service. Please be prepared to pay the required co-pay, deductible or deposit on the day of service as defined by your plan.    Call your surgeon immediately if you experience any of the following symptoms:  Sore Throat  Shortness of Breath or difficulty breathing  Cough  Chills  Body soreness or muscle pain  Headache  Fever  New loss of taste or smell  Do not arrive for your surgery ill.  Your procedure will need to be rescheduled to another time.  You will need to call your physician before the day of surgery to avoid any unnecessary exposure to hospital staff as well as other patients.

## 2022-10-19 ENCOUNTER — HOSPITAL ENCOUNTER (OUTPATIENT)
Facility: HOSPITAL | Age: 57
Setting detail: HOSPITAL OUTPATIENT SURGERY
Discharge: HOME | End: 2022-10-19
Attending: STUDENT IN AN ORGANIZED HEALTH CARE EDUCATION/TRAINING PROGRAM | Admitting: STUDENT IN AN ORGANIZED HEALTH CARE EDUCATION/TRAINING PROGRAM

## 2022-10-19 ENCOUNTER — ANESTHESIA (OUTPATIENT)
Dept: PERIOP | Facility: HOSPITAL | Age: 57
End: 2022-10-19

## 2022-10-19 ENCOUNTER — ANESTHESIA EVENT (OUTPATIENT)
Dept: PERIOP | Facility: HOSPITAL | Age: 57
End: 2022-10-19

## 2022-10-19 VITALS
SYSTOLIC BLOOD PRESSURE: 131 MMHG | TEMPERATURE: 98.3 F | BODY MASS INDEX: 37.92 KG/M2 | OXYGEN SATURATION: 98 % | WEIGHT: 279.98 LBS | RESPIRATION RATE: 18 BRPM | HEART RATE: 67 BPM | HEIGHT: 72 IN | DIASTOLIC BLOOD PRESSURE: 81 MMHG

## 2022-10-19 DIAGNOSIS — M79.89 SOFT TISSUE MASS: Primary | ICD-10-CM

## 2022-10-19 PROCEDURE — 11401 EXC TR-EXT B9+MARG 0.6-1 CM: CPT | Performed by: STUDENT IN AN ORGANIZED HEALTH CARE EDUCATION/TRAINING PROGRAM

## 2022-10-19 PROCEDURE — 11403 EXC TR-EXT B9+MARG 2.1-3CM: CPT | Performed by: STUDENT IN AN ORGANIZED HEALTH CARE EDUCATION/TRAINING PROGRAM

## 2022-10-19 PROCEDURE — 25010000002 FENTANYL CITRATE (PF) 100 MCG/2ML SOLUTION: Performed by: NURSE ANESTHETIST, CERTIFIED REGISTERED

## 2022-10-19 PROCEDURE — 88304 TISSUE EXAM BY PATHOLOGIST: CPT | Performed by: STUDENT IN AN ORGANIZED HEALTH CARE EDUCATION/TRAINING PROGRAM

## 2022-10-19 PROCEDURE — 25010000002 CEFAZOLIN IN DEXTROSE 2-4 GM/100ML-% SOLUTION: Performed by: STUDENT IN AN ORGANIZED HEALTH CARE EDUCATION/TRAINING PROGRAM

## 2022-10-19 PROCEDURE — 25010000002 PROPOFOL 10 MG/ML EMULSION: Performed by: NURSE ANESTHETIST, CERTIFIED REGISTERED

## 2022-10-19 PROCEDURE — 25010000002 MIDAZOLAM PER 1 MG: Performed by: ANESTHESIOLOGY

## 2022-10-19 RX ORDER — LABETALOL HYDROCHLORIDE 5 MG/ML
5 INJECTION, SOLUTION INTRAVENOUS
Status: DISCONTINUED | OUTPATIENT
Start: 2022-10-19 | End: 2022-10-19 | Stop reason: HOSPADM

## 2022-10-19 RX ORDER — DIPHENHYDRAMINE HCL 25 MG
25 CAPSULE ORAL
Status: DISCONTINUED | OUTPATIENT
Start: 2022-10-19 | End: 2022-10-19 | Stop reason: HOSPADM

## 2022-10-19 RX ORDER — ONDANSETRON 2 MG/ML
4 INJECTION INTRAMUSCULAR; INTRAVENOUS ONCE AS NEEDED
Status: DISCONTINUED | OUTPATIENT
Start: 2022-10-19 | End: 2022-10-19 | Stop reason: HOSPADM

## 2022-10-19 RX ORDER — FAMOTIDINE 10 MG/ML
20 INJECTION, SOLUTION INTRAVENOUS ONCE
Status: COMPLETED | OUTPATIENT
Start: 2022-10-19 | End: 2022-10-19

## 2022-10-19 RX ORDER — SODIUM CHLORIDE, SODIUM LACTATE, POTASSIUM CHLORIDE, CALCIUM CHLORIDE 600; 310; 30; 20 MG/100ML; MG/100ML; MG/100ML; MG/100ML
9 INJECTION, SOLUTION INTRAVENOUS CONTINUOUS
Status: DISCONTINUED | OUTPATIENT
Start: 2022-10-19 | End: 2022-10-19 | Stop reason: HOSPADM

## 2022-10-19 RX ORDER — IPRATROPIUM BROMIDE AND ALBUTEROL SULFATE 2.5; .5 MG/3ML; MG/3ML
3 SOLUTION RESPIRATORY (INHALATION) ONCE AS NEEDED
Status: DISCONTINUED | OUTPATIENT
Start: 2022-10-19 | End: 2022-10-19 | Stop reason: HOSPADM

## 2022-10-19 RX ORDER — HYDROCODONE BITARTRATE AND ACETAMINOPHEN 7.5; 325 MG/1; MG/1
1 TABLET ORAL ONCE AS NEEDED
Status: DISCONTINUED | OUTPATIENT
Start: 2022-10-19 | End: 2022-10-19 | Stop reason: HOSPADM

## 2022-10-19 RX ORDER — FENTANYL CITRATE 50 UG/ML
INJECTION, SOLUTION INTRAMUSCULAR; INTRAVENOUS AS NEEDED
Status: DISCONTINUED | OUTPATIENT
Start: 2022-10-19 | End: 2022-10-19 | Stop reason: SURG

## 2022-10-19 RX ORDER — DIPHENHYDRAMINE HYDROCHLORIDE 50 MG/ML
12.5 INJECTION INTRAMUSCULAR; INTRAVENOUS
Status: DISCONTINUED | OUTPATIENT
Start: 2022-10-19 | End: 2022-10-19 | Stop reason: HOSPADM

## 2022-10-19 RX ORDER — SODIUM CHLORIDE 0.9 % (FLUSH) 0.9 %
3 SYRINGE (ML) INJECTION EVERY 12 HOURS SCHEDULED
Status: DISCONTINUED | OUTPATIENT
Start: 2022-10-19 | End: 2022-10-19 | Stop reason: HOSPADM

## 2022-10-19 RX ORDER — SODIUM CHLORIDE, SODIUM LACTATE, POTASSIUM CHLORIDE, CALCIUM CHLORIDE 600; 310; 30; 20 MG/100ML; MG/100ML; MG/100ML; MG/100ML
100 INJECTION, SOLUTION INTRAVENOUS CONTINUOUS
Status: DISCONTINUED | OUTPATIENT
Start: 2022-10-19 | End: 2022-10-19 | Stop reason: HOSPADM

## 2022-10-19 RX ORDER — LIDOCAINE HYDROCHLORIDE 10 MG/ML
0.5 INJECTION, SOLUTION EPIDURAL; INFILTRATION; INTRACAUDAL; PERINEURAL ONCE AS NEEDED
Status: DISCONTINUED | OUTPATIENT
Start: 2022-10-19 | End: 2022-10-19 | Stop reason: HOSPADM

## 2022-10-19 RX ORDER — CEFAZOLIN SODIUM 2 G/100ML
2 INJECTION, SOLUTION INTRAVENOUS ONCE
Status: COMPLETED | OUTPATIENT
Start: 2022-10-19 | End: 2022-10-19

## 2022-10-19 RX ORDER — OXYCODONE AND ACETAMINOPHEN 7.5; 325 MG/1; MG/1
1 TABLET ORAL EVERY 4 HOURS PRN
Status: DISCONTINUED | OUTPATIENT
Start: 2022-10-19 | End: 2022-10-19 | Stop reason: HOSPADM

## 2022-10-19 RX ORDER — FENTANYL CITRATE 50 UG/ML
50 INJECTION, SOLUTION INTRAMUSCULAR; INTRAVENOUS
Status: DISCONTINUED | OUTPATIENT
Start: 2022-10-19 | End: 2022-10-19 | Stop reason: HOSPADM

## 2022-10-19 RX ORDER — PROMETHAZINE HYDROCHLORIDE 25 MG/1
25 SUPPOSITORY RECTAL ONCE AS NEEDED
Status: DISCONTINUED | OUTPATIENT
Start: 2022-10-19 | End: 2022-10-19 | Stop reason: HOSPADM

## 2022-10-19 RX ORDER — NALOXONE HCL 0.4 MG/ML
0.2 VIAL (ML) INJECTION AS NEEDED
Status: DISCONTINUED | OUTPATIENT
Start: 2022-10-19 | End: 2022-10-19 | Stop reason: HOSPADM

## 2022-10-19 RX ORDER — FLUMAZENIL 0.1 MG/ML
0.2 INJECTION INTRAVENOUS AS NEEDED
Status: DISCONTINUED | OUTPATIENT
Start: 2022-10-19 | End: 2022-10-19 | Stop reason: HOSPADM

## 2022-10-19 RX ORDER — HYDROMORPHONE HYDROCHLORIDE 1 MG/ML
0.5 INJECTION, SOLUTION INTRAMUSCULAR; INTRAVENOUS; SUBCUTANEOUS
Status: DISCONTINUED | OUTPATIENT
Start: 2022-10-19 | End: 2022-10-19 | Stop reason: HOSPADM

## 2022-10-19 RX ORDER — IBUPROFEN 600 MG/1
600 TABLET ORAL ONCE AS NEEDED
Status: DISCONTINUED | OUTPATIENT
Start: 2022-10-19 | End: 2022-10-19 | Stop reason: HOSPADM

## 2022-10-19 RX ORDER — PROMETHAZINE HYDROCHLORIDE 25 MG/1
25 TABLET ORAL ONCE AS NEEDED
Status: DISCONTINUED | OUTPATIENT
Start: 2022-10-19 | End: 2022-10-19 | Stop reason: HOSPADM

## 2022-10-19 RX ORDER — MIDAZOLAM HYDROCHLORIDE 1 MG/ML
1 INJECTION INTRAMUSCULAR; INTRAVENOUS
Status: COMPLETED | OUTPATIENT
Start: 2022-10-19 | End: 2022-10-19

## 2022-10-19 RX ORDER — SODIUM CHLORIDE 0.9 % (FLUSH) 0.9 %
3-10 SYRINGE (ML) INJECTION AS NEEDED
Status: DISCONTINUED | OUTPATIENT
Start: 2022-10-19 | End: 2022-10-19 | Stop reason: HOSPADM

## 2022-10-19 RX ORDER — MAGNESIUM HYDROXIDE 1200 MG/15ML
LIQUID ORAL AS NEEDED
Status: DISCONTINUED | OUTPATIENT
Start: 2022-10-19 | End: 2022-10-19 | Stop reason: HOSPADM

## 2022-10-19 RX ORDER — EPHEDRINE SULFATE 50 MG/ML
5 INJECTION, SOLUTION INTRAVENOUS ONCE AS NEEDED
Status: DISCONTINUED | OUTPATIENT
Start: 2022-10-19 | End: 2022-10-19 | Stop reason: HOSPADM

## 2022-10-19 RX ORDER — TRAMADOL HYDROCHLORIDE 50 MG/1
50 TABLET ORAL EVERY 6 HOURS PRN
Qty: 8 TABLET | Refills: 0 | Status: SHIPPED | OUTPATIENT
Start: 2022-10-19 | End: 2023-01-18

## 2022-10-19 RX ORDER — HYDRALAZINE HYDROCHLORIDE 20 MG/ML
5 INJECTION INTRAMUSCULAR; INTRAVENOUS
Status: DISCONTINUED | OUTPATIENT
Start: 2022-10-19 | End: 2022-10-19 | Stop reason: HOSPADM

## 2022-10-19 RX ADMIN — PROPOFOL 100 MCG/KG/MIN: 10 INJECTION, EMULSION INTRAVENOUS at 12:53

## 2022-10-19 RX ADMIN — FAMOTIDINE 20 MG: 10 INJECTION INTRAVENOUS at 11:13

## 2022-10-19 RX ADMIN — SODIUM CHLORIDE, POTASSIUM CHLORIDE, SODIUM LACTATE AND CALCIUM CHLORIDE 9 ML/HR: 600; 310; 30; 20 INJECTION, SOLUTION INTRAVENOUS at 11:13

## 2022-10-19 RX ADMIN — FENTANYL CITRATE 100 MCG: 50 INJECTION, SOLUTION INTRAMUSCULAR; INTRAVENOUS at 12:50

## 2022-10-19 RX ADMIN — MIDAZOLAM 1 MG: 1 INJECTION, SOLUTION INTRAMUSCULAR; INTRAVENOUS at 11:13

## 2022-10-19 RX ADMIN — MIDAZOLAM 1 MG: 1 INJECTION, SOLUTION INTRAMUSCULAR; INTRAVENOUS at 12:02

## 2022-10-19 RX ADMIN — CEFAZOLIN SODIUM 2 G: 2 INJECTION, SOLUTION INTRAVENOUS at 12:30

## 2022-10-19 NOTE — ANESTHESIA POSTPROCEDURE EVALUATION
Patient: Orlando Friedman    Procedure Summary     Date: 10/19/22 Room / Location: [facility] OR  / [facility] MAIN OR    Anesthesia Start: 1236 Anesthesia Stop: 1336    Procedure: MASS SOFT TISSUE EXCISION OF THE BACK X3 Diagnosis:       Soft tissue mass      (Soft tissue mass [M79.89])    Surgeons: Janine Queen MD Provider: Chano Lara MD    Anesthesia Type: MAC ASA Status: 3          Anesthesia Type: MAC    Vitals  Vitals Value Taken Time   /81 10/19/22 1415   Temp 36.8 °C (98.3 °F) 10/19/22 1331   Pulse 63 10/19/22 1417   Resp 18 10/19/22 1400   SpO2 97 % 10/19/22 1417   Vitals shown include unvalidated device data.        Post Anesthesia Care and Evaluation    Patient location during evaluation: PACU  Patient participation: complete - patient participated  Level of consciousness: awake and alert  Pain management: adequate    Airway patency: patent  Anesthetic complications: No anesthetic complications    Cardiovascular status: acceptable  Respiratory status: acceptable  Hydration status: acceptable    Comments: --------------------            10/19/22               1400     --------------------   BP:       129/80     Pulse:      69       Resp:       18       Temp:                SpO2:      97%      --------------------

## 2022-10-19 NOTE — ADDENDUM NOTE
Addendum  created 10/19/22 1418 by Chano Lara MD    Attestation recorded in Intraprocedure, Intraprocedure Attestations filed

## 2022-10-19 NOTE — ANESTHESIA PREPROCEDURE EVALUATION
Anesthesia Evaluation     Patient summary reviewed and Nursing notes reviewed   NPO Solid Status: > 8 hours  NPO Liquid Status: > 2 hours           Airway   Mallampati: III  TM distance: >3 FB  Neck ROM: full  Possible difficult intubation and Narrow palate  Dental - normal exam     Pulmonary - normal exam   (+) a smoker Former, sleep apnea,   Cardiovascular - normal exam    (+) hypertension, hyperlipidemia,       Neuro/Psych- negative ROS  GI/Hepatic/Renal/Endo    (+) obesity,  GI bleeding resolved,     Musculoskeletal     (+) back pain,   Abdominal    Substance History - negative use     OB/GYN negative ob/gyn ROS         Other   arthritis,      ROS/Med Hx Other: Crohn's                    Anesthesia Plan    ASA 3     MAC       Anesthetic plan, risks, benefits, and alternatives have been provided, discussed and informed consent has been obtained with: patient.

## 2022-10-19 NOTE — OP NOTE
OPERATIVE REPORT    DATE: 10/19/2022    SURGEON: Janine Queen MD     ASSISTANT: Den Perry, who was present for necessary suctioning, retracting, suturing and camera holding throughout the procedure     OPERATION PERFORMED: Excision of subcutaneous mass of the back x3    PREOPERATIVE DIAGNOSIS: Subcutaneous mass of the back x3    POSTOPERATIVE DIAGNOSIS: Same    ANESTHESIA: MAC    SPECIMEN: Back masses    DRAINS: None    BLOOD LOSS: Minimal    INDICATION FOR OPERATION: Mr. Orlando Friedman is a 57 y.o. year old gentleman who was recently seen in the office with 3 subcutaneous masses of the back.  1 is already been excised and has recurred.  He requested excision in the operating room. All risks (including bleeding, infection, damage to surrounding structures, recurrence), benefits and alternatives were explained to the patient who agreed and wished to proceed. Informed consent was signed.     OPERATIVE COURSE: The patient was taken to the operating room, transferred onto the operating room table, and underwent general endotracheal anesthesia without incident.  The patient was placed in the right lateral decubitus position.  The patient was prepped and draped in sterile fashion. A time out was performed and preoperative antibiotics were given.  Half percent Marcaine with epinephrine was injected into the skin and subcutaneous tissues.  There were 2 of these masses along the upper back.  One was about 1 cm and was about 3 cm in size.  There is one on the left lower back that was about 1 cm in size.  Each of these were ellipsed out with a scalpel.  The cyst were completely removed and passed off the specimen.  The wounds were probed and no other abnormalities were identified.  They were irrigated and appeared hemostatic.  They are closed with interrupted 3-0 Vicryl sutures and a running 4-0 Vicryl suture.  Skin glue was placed over the incisions.  The patient tolerated the procedure well. All needle and lap counts  were correct at the end of the case. The patient was then awoken from anesthesia and taken to recovery for further monitoring.       Janine Queen MD   General and Endoscopic Surgery  [facility]    [address], [address]  [place], [place], [address]  P: [phone]  F: [phone]

## 2022-10-20 LAB
LAB AP CASE REPORT: NORMAL
PATH REPORT.FINAL DX SPEC: NORMAL
PATH REPORT.GROSS SPEC: NORMAL

## 2023-01-18 ENCOUNTER — OFFICE VISIT (OUTPATIENT)
Dept: FAMILY MEDICINE CLINIC | Facility: CLINIC | Age: 58
End: 2023-01-18
Payer: COMMERCIAL

## 2023-01-18 VITALS
HEIGHT: 72 IN | SYSTOLIC BLOOD PRESSURE: 126 MMHG | HEART RATE: 76 BPM | OXYGEN SATURATION: 97 % | DIASTOLIC BLOOD PRESSURE: 80 MMHG | WEIGHT: 280 LBS | BODY MASS INDEX: 37.93 KG/M2 | TEMPERATURE: 97.5 F

## 2023-01-18 DIAGNOSIS — N52.9 IMPOTENCE OF ORGANIC ORIGIN: ICD-10-CM

## 2023-01-18 DIAGNOSIS — Z79.899 HIGH RISK MEDICATIONS (NOT ANTICOAGULANTS) LONG-TERM USE: ICD-10-CM

## 2023-01-18 DIAGNOSIS — E55.9 VITAMIN D DEFICIENCY: ICD-10-CM

## 2023-01-18 DIAGNOSIS — K50.011 CROHN'S DISEASE OF SMALL INTESTINE WITH RECTAL BLEEDING: ICD-10-CM

## 2023-01-18 DIAGNOSIS — M51.36 DEGENERATIVE DISC DISEASE, LUMBAR: ICD-10-CM

## 2023-01-18 DIAGNOSIS — E78.2 MIXED HYPERLIPIDEMIA: ICD-10-CM

## 2023-01-18 DIAGNOSIS — E66.09 EXOGENOUS OBESITY: ICD-10-CM

## 2023-01-18 DIAGNOSIS — I10 ESSENTIAL HYPERTENSION: Primary | ICD-10-CM

## 2023-01-18 DIAGNOSIS — G47.33 OSA (OBSTRUCTIVE SLEEP APNEA): ICD-10-CM

## 2023-01-18 DIAGNOSIS — K50.111 CROHN'S DISEASE OF LARGE INTESTINE WITH RECTAL BLEEDING: ICD-10-CM

## 2023-01-18 DIAGNOSIS — N40.0 BENIGN PROSTATIC HYPERPLASIA WITHOUT URINARY OBSTRUCTION: ICD-10-CM

## 2023-01-18 DIAGNOSIS — Z79.899 HIGH RISK MEDICATION USE: ICD-10-CM

## 2023-01-18 DIAGNOSIS — R73.02 GLUCOSE INTOLERANCE (IMPAIRED GLUCOSE TOLERANCE): ICD-10-CM

## 2023-01-18 PROCEDURE — 99213 OFFICE O/P EST LOW 20 MIN: CPT | Performed by: FAMILY MEDICINE

## 2023-01-18 RX ORDER — IBUPROFEN 800 MG/1
800 TABLET ORAL EVERY 8 HOURS PRN
Qty: 90 TABLET | Refills: 3 | Status: SHIPPED | OUTPATIENT
Start: 2023-01-18

## 2023-01-18 RX ORDER — HYDROCODONE BITARTRATE AND ACETAMINOPHEN 5; 325 MG/1; MG/1
1 TABLET ORAL EVERY 6 HOURS PRN
Qty: 40 TABLET | Refills: 0 | Status: SHIPPED | OUTPATIENT
Start: 2023-01-18

## 2023-01-18 RX ORDER — LISINOPRIL AND HYDROCHLOROTHIAZIDE 20; 12.5 MG/1; MG/1
1 TABLET ORAL DAILY
Qty: 90 TABLET | Refills: 1 | Status: SHIPPED | OUTPATIENT
Start: 2023-01-18

## 2023-01-19 NOTE — PROGRESS NOTES
Subjective   Orlando Friedman is a 57 y.o. male with   Chief Complaint   Patient presents with   • Hypertension   • Med Refill   • Back Pain     Wanting refill Hydrocodone.   .    History of Present Illness   57-year-old white male with known history of essential hypertension using lisinopril/hydrochlorothiazide at 20/12.5 at 1 tablet daily.  This medication is used appropriately and is well-tolerated without side effects.  Blood pressure has been well controlled with this regimen.  Patient does have significant degenerative disc disease of the lumbar spine using ibuprofen at 800 mg on an as-needed basis.  On a rare occasion he will use hydrocodone and is requesting refill of same.  He does have a history of hyperlipidemia attempting to control this status with dietary measures alone.  There is also a history of vitamin D deficiency, Crohn's disease, BPH and RICH.  Last fasting labs were performed in January 2022.  Patient is currently not fasting but willing to return at a later date for same.  The following portions of the patient's history were reviewed and updated as appropriate: allergies, current medications, past family history, past medical history, past social history, past surgical history and problem list.    Review of Systems   Respiratory: Positive for apnea.    Cardiovascular:        Hypertension, hyperlipidemia   Endocrine:        Exogenous obesity, vitamin D deficiency   Genitourinary:        BPH   Musculoskeletal: Positive for back pain.       Objective     Vitals:    01/18/23 1521   BP: 126/80   Pulse: 76   Temp: 97.5 °F (36.4 °C)   SpO2: 97%       No results found for this or any previous visit (from the past 672 hour(s)).    Physical Exam  Vitals and nursing note reviewed.   Constitutional:       Appearance: Normal appearance. He is well-developed and well-groomed. He is obese.      Comments: Exogenous obesity with a BMI of 38   HENT:      Head: Normocephalic and atraumatic.   Neck:      Thyroid: No  thyroid mass or thyromegaly.      Vascular: Normal carotid pulses. No carotid bruit.      Trachea: Trachea and phonation normal.   Cardiovascular:      Rate and Rhythm: Normal rate and regular rhythm.      Heart sounds: Normal heart sounds. No murmur heard.    No friction rub. No gallop.   Pulmonary:      Effort: Pulmonary effort is normal. No respiratory distress.      Breath sounds: Normal breath sounds. No decreased breath sounds, wheezing, rhonchi or rales.   Musculoskeletal:      Cervical back: Neck supple.   Lymphadenopathy:      Cervical: No cervical adenopathy.   Skin:     General: Skin is warm and dry.      Findings: No rash.   Neurological:      Mental Status: He is alert and oriented to person, place, and time.   Psychiatric:         Attention and Perception: Attention and perception normal.         Mood and Affect: Mood and affect normal.         Speech: Speech normal.         Behavior: Behavior normal. Behavior is cooperative.         Thought Content: Thought content normal.         Cognition and Memory: Cognition and memory normal.         Judgment: Judgment normal.         Assessment & Plan   Diagnoses and all orders for this visit:    1. Essential hypertension (Primary)  -     lisinopril-hydrochlorothiazide (PRINZIDE,ZESTORETIC) 20-12.5 MG per tablet; Take 1 tablet by mouth Daily.  Dispense: 90 tablet; Refill: 1  -     HYDROcodone-acetaminophen (NORCO) 5-325 MG per tablet; Take 1 tablet by mouth Every 6 (Six) Hours As Needed for Moderate Pain.  Dispense: 40 tablet; Refill: 0  -     ibuprofen (ADVIL,MOTRIN) 800 MG tablet; Take 1 tablet by mouth Every 8 (Eight) Hours As Needed for Mild Pain.  Dispense: 90 tablet; Refill: 3  -     Comprehensive metabolic panel; Future  -     PSA DIAGNOSTIC ONLY; Future  -     Vitamin D 25 hydroxy; Future  -     TSH; Future  -     Lipid panel; Future  -     CBC w AUTO Differential; Future  -     Hemoglobin A1c; Future    2. Mixed hyperlipidemia  -     Comprehensive  metabolic panel; Future  -     PSA DIAGNOSTIC ONLY; Future  -     Vitamin D 25 hydroxy; Future  -     TSH; Future  -     Lipid panel; Future  -     CBC w AUTO Differential; Future  -     Hemoglobin A1c; Future    3. Exogenous obesity  -     lisinopril-hydrochlorothiazide (PRINZIDE,ZESTORETIC) 20-12.5 MG per tablet; Take 1 tablet by mouth Daily.  Dispense: 90 tablet; Refill: 1  -     HYDROcodone-acetaminophen (NORCO) 5-325 MG per tablet; Take 1 tablet by mouth Every 6 (Six) Hours As Needed for Moderate Pain.  Dispense: 40 tablet; Refill: 0  -     ibuprofen (ADVIL,MOTRIN) 800 MG tablet; Take 1 tablet by mouth Every 8 (Eight) Hours As Needed for Mild Pain.  Dispense: 90 tablet; Refill: 3  -     Comprehensive metabolic panel; Future  -     PSA DIAGNOSTIC ONLY; Future  -     Vitamin D 25 hydroxy; Future  -     TSH; Future  -     Lipid panel; Future  -     CBC w AUTO Differential; Future  -     Hemoglobin A1c; Future    4. High risk medications (not anticoagulants) long-term use  -     Compliance Drug Analysis, Ur - Urine, Clean Catch  -     Comprehensive metabolic panel; Future  -     PSA DIAGNOSTIC ONLY; Future  -     Vitamin D 25 hydroxy; Future  -     TSH; Future  -     Lipid panel; Future  -     CBC w AUTO Differential; Future  -     Hemoglobin A1c; Future    5. Vitamin D deficiency  -     lisinopril-hydrochlorothiazide (PRINZIDE,ZESTORETIC) 20-12.5 MG per tablet; Take 1 tablet by mouth Daily.  Dispense: 90 tablet; Refill: 1  -     HYDROcodone-acetaminophen (NORCO) 5-325 MG per tablet; Take 1 tablet by mouth Every 6 (Six) Hours As Needed for Moderate Pain.  Dispense: 40 tablet; Refill: 0  -     ibuprofen (ADVIL,MOTRIN) 800 MG tablet; Take 1 tablet by mouth Every 8 (Eight) Hours As Needed for Mild Pain.  Dispense: 90 tablet; Refill: 3  -     Comprehensive metabolic panel; Future  -     PSA DIAGNOSTIC ONLY; Future  -     Vitamin D 25 hydroxy; Future  -     TSH; Future  -     Lipid panel; Future  -     CBC w AUTO  Differential; Future  -     Hemoglobin A1c; Future    6. Degenerative disc disease, lumbar  -     lisinopril-hydrochlorothiazide (PRINZIDE,ZESTORETIC) 20-12.5 MG per tablet; Take 1 tablet by mouth Daily.  Dispense: 90 tablet; Refill: 1  -     HYDROcodone-acetaminophen (NORCO) 5-325 MG per tablet; Take 1 tablet by mouth Every 6 (Six) Hours As Needed for Moderate Pain.  Dispense: 40 tablet; Refill: 0  -     ibuprofen (ADVIL,MOTRIN) 800 MG tablet; Take 1 tablet by mouth Every 8 (Eight) Hours As Needed for Mild Pain.  Dispense: 90 tablet; Refill: 3  -     Comprehensive metabolic panel; Future  -     PSA DIAGNOSTIC ONLY; Future  -     Vitamin D 25 hydroxy; Future  -     TSH; Future  -     Lipid panel; Future  -     CBC w AUTO Differential; Future  -     Hemoglobin A1c; Future    7. RICH (obstructive sleep apnea)  -     Comprehensive metabolic panel; Future  -     PSA DIAGNOSTIC ONLY; Future  -     Vitamin D 25 hydroxy; Future  -     TSH; Future  -     Lipid panel; Future  -     CBC w AUTO Differential; Future  -     Hemoglobin A1c; Future    8. Crohn's disease of small intestine with rectal bleeding (HCC)  -     lisinopril-hydrochlorothiazide (PRINZIDE,ZESTORETIC) 20-12.5 MG per tablet; Take 1 tablet by mouth Daily.  Dispense: 90 tablet; Refill: 1  -     HYDROcodone-acetaminophen (NORCO) 5-325 MG per tablet; Take 1 tablet by mouth Every 6 (Six) Hours As Needed for Moderate Pain.  Dispense: 40 tablet; Refill: 0  -     ibuprofen (ADVIL,MOTRIN) 800 MG tablet; Take 1 tablet by mouth Every 8 (Eight) Hours As Needed for Mild Pain.  Dispense: 90 tablet; Refill: 3  -     Comprehensive metabolic panel; Future  -     PSA DIAGNOSTIC ONLY; Future  -     Vitamin D 25 hydroxy; Future  -     TSH; Future  -     Lipid panel; Future  -     CBC w AUTO Differential; Future  -     Hemoglobin A1c; Future    9. Benign prostatic hypertrophy without urinary obstruction  -     lisinopril-hydrochlorothiazide (PRINZIDE,ZESTORETIC) 20-12.5 MG per  tablet; Take 1 tablet by mouth Daily.  Dispense: 90 tablet; Refill: 1  -     HYDROcodone-acetaminophen (NORCO) 5-325 MG per tablet; Take 1 tablet by mouth Every 6 (Six) Hours As Needed for Moderate Pain.  Dispense: 40 tablet; Refill: 0  -     ibuprofen (ADVIL,MOTRIN) 800 MG tablet; Take 1 tablet by mouth Every 8 (Eight) Hours As Needed for Mild Pain.  Dispense: 90 tablet; Refill: 3  -     Comprehensive metabolic panel; Future  -     PSA DIAGNOSTIC ONLY; Future  -     Vitamin D 25 hydroxy; Future  -     TSH; Future  -     Lipid panel; Future  -     CBC w AUTO Differential; Future  -     Hemoglobin A1c; Future    10. High risk medication use  -     lisinopril-hydrochlorothiazide (PRINZIDE,ZESTORETIC) 20-12.5 MG per tablet; Take 1 tablet by mouth Daily.  Dispense: 90 tablet; Refill: 1  -     HYDROcodone-acetaminophen (NORCO) 5-325 MG per tablet; Take 1 tablet by mouth Every 6 (Six) Hours As Needed for Moderate Pain.  Dispense: 40 tablet; Refill: 0  -     ibuprofen (ADVIL,MOTRIN) 800 MG tablet; Take 1 tablet by mouth Every 8 (Eight) Hours As Needed for Mild Pain.  Dispense: 90 tablet; Refill: 3  -     Comprehensive metabolic panel; Future  -     PSA DIAGNOSTIC ONLY; Future  -     Vitamin D 25 hydroxy; Future  -     TSH; Future  -     Lipid panel; Future  -     CBC w AUTO Differential; Future  -     Hemoglobin A1c; Future    11. Crohn's disease of large intestine with rectal bleeding (HCC)  -     lisinopril-hydrochlorothiazide (PRINZIDE,ZESTORETIC) 20-12.5 MG per tablet; Take 1 tablet by mouth Daily.  Dispense: 90 tablet; Refill: 1  -     HYDROcodone-acetaminophen (NORCO) 5-325 MG per tablet; Take 1 tablet by mouth Every 6 (Six) Hours As Needed for Moderate Pain.  Dispense: 40 tablet; Refill: 0  -     ibuprofen (ADVIL,MOTRIN) 800 MG tablet; Take 1 tablet by mouth Every 8 (Eight) Hours As Needed for Mild Pain.  Dispense: 90 tablet; Refill: 3  -     Comprehensive metabolic panel; Future  -     PSA DIAGNOSTIC ONLY;  Future  -     Vitamin D 25 hydroxy; Future  -     TSH; Future  -     Lipid panel; Future  -     CBC w AUTO Differential; Future  -     Hemoglobin A1c; Future    12. Impotence of organic origin  -     lisinopril-hydrochlorothiazide (PRINZIDE,ZESTORETIC) 20-12.5 MG per tablet; Take 1 tablet by mouth Daily.  Dispense: 90 tablet; Refill: 1  -     HYDROcodone-acetaminophen (NORCO) 5-325 MG per tablet; Take 1 tablet by mouth Every 6 (Six) Hours As Needed for Moderate Pain.  Dispense: 40 tablet; Refill: 0  -     ibuprofen (ADVIL,MOTRIN) 800 MG tablet; Take 1 tablet by mouth Every 8 (Eight) Hours As Needed for Mild Pain.  Dispense: 90 tablet; Refill: 3  -     Comprehensive metabolic panel; Future  -     PSA DIAGNOSTIC ONLY; Future  -     Vitamin D 25 hydroxy; Future  -     TSH; Future  -     Lipid panel; Future  -     CBC w AUTO Differential; Future  -     Hemoglobin A1c; Future    13. Glucose intolerance (impaired glucose tolerance)  -     Comprehensive metabolic panel; Future  -     PSA DIAGNOSTIC ONLY; Future  -     Vitamin D 25 hydroxy; Future  -     TSH; Future  -     Lipid panel; Future  -     CBC w AUTO Differential; Future  -     Hemoglobin A1c; Future        Return in about 6 months (around 7/18/2023) for Recheck.

## 2023-01-28 LAB — DRUGS UR: NORMAL

## 2023-03-10 ENCOUNTER — TELEPHONE (OUTPATIENT)
Dept: GASTROENTEROLOGY | Facility: CLINIC | Age: 58
End: 2023-03-10
Payer: COMMERCIAL

## 2023-03-10 NOTE — TELEPHONE ENCOUNTER
Caller: Ofelia Friedman    Relationship: Emergency Contact    Best call back number: [phone]    Requested Prescriptions:   Requested Prescriptions      No prescriptions requested or ordered in this encounter    PATIENT IS DUE FOR INFUSION OF ENTIVIO NEXT WEEK.    Pharmacy where request should be sent: [facility] [phone]    Additional details provided by patient: PATIENT'S PHARMACY HAS BEEN TRYING TO GET THIS MEDICATION REFILLED SINCE 02/18/23 WITH NO LUCK.     Does the patient have less than a 3 day supply:  [x] Yes  [] No    Would you like a call back once the refill request has been completed: [x] Yes [] No    If the office needs to give you a call back, can they leave a voicemail: [x] Yes [] No    Jim Womack Rep   03/10/23 11:25 EST

## 2023-03-13 NOTE — TELEPHONE ENCOUNTER
Caller: Ofelia Friedman     Relationship: Emergency Contact     Best call back number: [phone]     Requested Prescriptions:   Requested Prescriptions        No prescriptions requested or ordered in this encounter    PATIENT IS DUE FOR INFUSION OF ENTYVIO TODAY OR TOMORROW.     Pharmacy where request should be sent: [facility] [phone]     Additional details provided by patient: PATIENT'S PHARMACY HAS BEEN TRYING TO GET THIS MEDICATION REFILLED SINCE 02/18/23 WITH NO LUCK.      Does the patient have less than a 3 day supply:  [x]? Yes  []? No     Would you like a call back once the refill request has been completed: [x]? Yes []? No     If the office needs to give you a call back, can they leave a voicemail: [x]? Yes []? No

## 2023-03-14 ENCOUNTER — TELEPHONE (OUTPATIENT)
Dept: GASTROENTEROLOGY | Facility: CLINIC | Age: 58
End: 2023-03-14

## 2023-03-14 NOTE — TELEPHONE ENCOUNTER
Received e-mail from Christie with [facility] confirming that they did receive the signed order and will call patient to get him scheduled for his infusion.

## 2023-03-14 NOTE — TELEPHONE ENCOUNTER
Caller: Ofelia Friedman    Relationship: Emergency Contact    Best call back number: [phone]    What is the best time to reach you: ANYTIME    Who are you requesting to speak with (clinical staff, provider,  specific staff member): CLINICAL    What was the call regarding: PATIENT'S WIFE CALLED AND ASKED WHY HER  HASN'T GOTTEN HIS INJECTION (ENTYZIO) YET. SHE STATED THAT HE WAS SUPPOSED TO GET IT INJECTED YESTERDAY AND THE PHARMACY HASN'T SENT IT YET. PLEASE CALL PATIENT'S WIFE BACK.     Do you require a callback: YES

## 2023-03-14 NOTE — TELEPHONE ENCOUNTER
Refill paperwork has been signed and faxed to [facility]. Fax confirmation received. Message sent to Christie with [facility] to confirm that she has received the paperwork and to possibly have someone reach out to the patient today to get scheduled for the next infusion.

## 2023-03-14 NOTE — TELEPHONE ENCOUNTER
Called and spoke with Ofelia (Patient wife on release) I advised her that [facility] is going to fax the paperwork to me today for the refill and I will make sure that it is signed and faxed back to [facility] today. Ofelia expressed understanding and thanked me for my time.

## 2023-03-14 NOTE — TELEPHONE ENCOUNTER
Called and spoke with Christie at [facility] and they will check in to see about getting the patient scheduled for Entyvio infusion. Christie stated that she spoke with Sue at our office to get a refill order on the patient. [facility] will be sending over an order refill request to the office. I told Christie that I will be watching out for that order and obtain a providers signature as soon as it comes through.

## 2023-08-04 DIAGNOSIS — E66.09 EXOGENOUS OBESITY: ICD-10-CM

## 2023-08-04 DIAGNOSIS — M51.36 DEGENERATIVE DISC DISEASE, LUMBAR: ICD-10-CM

## 2023-08-04 DIAGNOSIS — N40.0 BENIGN PROSTATIC HYPERPLASIA WITHOUT URINARY OBSTRUCTION: ICD-10-CM

## 2023-08-04 DIAGNOSIS — K50.111 CROHN'S DISEASE OF LARGE INTESTINE WITH RECTAL BLEEDING: ICD-10-CM

## 2023-08-04 DIAGNOSIS — Z79.899 HIGH RISK MEDICATION USE: ICD-10-CM

## 2023-08-04 DIAGNOSIS — K50.011 CROHN'S DISEASE OF SMALL INTESTINE WITH RECTAL BLEEDING: ICD-10-CM

## 2023-08-04 DIAGNOSIS — E55.9 VITAMIN D DEFICIENCY: ICD-10-CM

## 2023-08-04 DIAGNOSIS — N52.9 IMPOTENCE OF ORGANIC ORIGIN: ICD-10-CM

## 2023-08-04 DIAGNOSIS — I10 ESSENTIAL HYPERTENSION: ICD-10-CM

## 2023-08-04 RX ORDER — LISINOPRIL AND HYDROCHLOROTHIAZIDE 20; 12.5 MG/1; MG/1
1 TABLET ORAL DAILY
Qty: 90 TABLET | Refills: 1 | Status: SHIPPED | OUTPATIENT
Start: 2023-08-04

## 2023-10-27 NOTE — TELEPHONE ENCOUNTER
"  Physical Therapy  Physical Therapy Treatment Note    Patient Name: Albaro Tony  MRN: [id number]  Today's Date: 10/27/2023  Time Calculation  Start Time: 0700  Stop Time: 0745  Time Calculation (min): 45 min    Insurance:  Visit number: 26 of 40  Authorization info: 40 visit limit  Insurance Type: MMO    General:  Reason for visit: s/p quadriceps tendon repair  Referred by: Dr. Dorman    Assessment:   Patient tolerated today's session w/ no issues. Demonstrates improvements in quad eccentric control and is progressing well w/ strengthening. Patient will benefit from ongoing PT services to continue to progress quad and hamstring strengthening as tolerated to reach established goals and maximize functional mobility.      Plan:  Eccentron, progress squats as tolerated.       Current Problem  1. Quadriceps tendon rupture, right, subsequent encounter        2. Knee pain        3. Stiffness of right knee        4. Injury of quadriceps tendon              Precautions:   0-90 degrees 1st 8 weeks, 50% WB first 6 weeks then WBAT.     Subjective:   Patient reports no new issues since his last visit. He has been performing all ADLs and HEP w/o a return of symptoms and feels his strength is continuing to improve.     Pain  Pain Assessment: 0-10  Pain Score: 0 - No pain    Performing HEP?: Yes      Objective:   R Knee AROM  Extension: WNL  Flexion: 125      Treatment Performed:    Therapeutic Exercise:    45 min  [employer] bike 1 mile  Total gym SL squats 3x15 w/ 3 band resistance  Eccentron x5 minutes  SL leg extensions 4x12 w/ 15 lbs  6\" step downs from rogue box w/ emphasis on control 4x15  Goblet squats to bench w/ 18lb KB 4x12      Ge Wilks, S-PT   " Caller: Ofelia Friedman    Relationship: Emergency Contact    Best call back number [phone]    What medication are you requesting: CHARLES.   GOT PRESCRIPTION REFILL FOR REVATION AGAIN, BUT IT DOESN'T WORK.    If a prescription is needed, what is your preferred pharmacy and phone number: [id number] - [place], [place] - [address] [address] AT [facility] [address] - [phone]  - [phone] FX

## 2023-12-14 ENCOUNTER — TELEPHONE (OUTPATIENT)
Dept: GASTROENTEROLOGY | Facility: CLINIC | Age: 58
End: 2023-12-14
Payer: COMMERCIAL

## 2023-12-14 NOTE — TELEPHONE ENCOUNTER
Patient called. No answer. Left message regarding the need for a follow up visit and lab work. Advised to call [phone], press option 1.     Update to Karie.

## 2023-12-14 NOTE — TELEPHONE ENCOUNTER
I received a fax from [facility] for updated records. Looking into the chart patient has not been seen since 3/22/22 and labs have not been completed since 1/20/22.

## 2023-12-15 ENCOUNTER — TELEPHONE (OUTPATIENT)
Dept: GASTROENTEROLOGY | Facility: CLINIC | Age: 58
End: 2023-12-15
Payer: COMMERCIAL

## 2023-12-15 NOTE — TELEPHONE ENCOUNTER
Caller: Ofelia Friedman    Relationship: Emergency Contact    Best call back number: [phone]    What is the best time to reach you: ANYTIME    Who are you requesting to speak with (clinical staff, provider,  specific staff member): CLINICAL STAFF    What was the call regarding: PTS WIFE CALLING TO SCHEDULE APPT. PT IS OUT OF HIS ENTYVIO. NEEDS REFILL ASAP. HUB UNABLE TO SCHEDULE URGENT APPT. SCHEDULED JAIMEE/[facility]-02.06.24. PLEASE CONTACT PTS WIFE IF URGENT APPT AVAILABLE OR IF REFILL CAN BE COMPLETED PRIOR TO APPT.     Is it okay if the provider responds through MyChart: NO

## 2023-12-19 ENCOUNTER — OFFICE VISIT (OUTPATIENT)
Dept: GASTROENTEROLOGY | Facility: CLINIC | Age: 58
End: 2023-12-19
Payer: COMMERCIAL

## 2023-12-19 VITALS
TEMPERATURE: 96.9 F | OXYGEN SATURATION: 95 % | BODY MASS INDEX: 38.36 KG/M2 | DIASTOLIC BLOOD PRESSURE: 126 MMHG | HEIGHT: 72 IN | WEIGHT: 283.2 LBS | HEART RATE: 68 BPM | SYSTOLIC BLOOD PRESSURE: 168 MMHG

## 2023-12-19 DIAGNOSIS — K50.811 CROHN'S DISEASE OF BOTH SMALL AND LARGE INTESTINE WITH RECTAL BLEEDING: Primary | ICD-10-CM

## 2023-12-19 PROCEDURE — 99213 OFFICE O/P EST LOW 20 MIN: CPT | Performed by: NURSE PRACTITIONER

## 2023-12-19 NOTE — PROGRESS NOTES
Chief Complaint   Patient presents with    Crohn's Disease       HPI    Orlando Friedman is a  58 y.o. male here for a follow up visit for Crohn's disease of the large and small bowel.    This patient follows with Dr. Herbert and myself.    Past medical history of back pain, hypertension, sleep apnea along with vitamin D deficiency.    He has been maintained on Entyvio after Humira treatment failure for at least the past 2 years.  He is tolerating infusions well.  He denies abdominal pain, rectal pain, diarrhea or weight loss.  His appetite is good.  He is due for routine lab work required for insurance coverage of biologic as well as to assess for vitamin deficiencies.    He is due for screening colonoscopy in 2025.    Past Medical History:   Diagnosis Date    Back pain     Benign prostatic hypertrophy without urinary obstruction 03/30/2017    Colitis     Colon polyp     Crohn's disease     Degenerative disc disease, lumbar 03/30/2017    Elevated cholesterol     Hypertension     Impotence of organic origin 03/30/2017    Sebaceous cyst     Sleep apnea     WEARS CPAP    Vitamin D deficiency 03/30/2017       Past Surgical History:   Procedure Laterality Date    COLONOSCOPY  2013 approx    Crohns per patiient    COLONOSCOPY N/A 5/31/2017    One 3 mm polyp in the cecum,.  PATH: MILD POLYPOID HYPERPLASIA, CHRONIC ACTIVE INFLAMMATION, MODERATE TO SEVERE    COLONOSCOPY N/A 8/26/2020    Procedure: COLONOSCOPY with biopsies;  Surgeon: Erwin Herbert MD;  Location: [facility] ENDOSCOPY;  Service: Gastroenterology;  Laterality: N/A;  pre- hx polyps, hx chron's  post-- transverse lipoma, chron's colitis    CYST REMOVAL N/A     EXCISION MASS TRUNK N/A 10/19/2022    Procedure: MASS SOFT TISSUE EXCISION OF THE BACK X3;  Surgeon: Janine Queen MD;  Location: [facility] MAIN OR;  Service: General;  Laterality: N/A;    WISDOM TOOTH EXTRACTION         Scheduled Meds:     Continuous Infusions: No current facility-administered medications  for this visit.      PRN Meds:     No Known Allergies    Social History     Socioeconomic History    Marital status:    Tobacco Use    Smoking status: Former     Packs/day: 1.50     Years: 10.00     Additional pack years: 0.00     Total pack years: 15.00     Types: Cigarettes     Quit date: 2009     Years since quittin.9    Smokeless tobacco: Never    Tobacco comments:     CAFFEINE USE: ONE CUP DAILY.   Vaping Use    Vaping Use: Never used   Substance and Sexual Activity    Alcohol use: Yes     Alcohol/week: 10.0 standard drinks of alcohol     Types: 10 Cans of beer per week    Drug use: No    Sexual activity: Yes     Partners: Female     Birth control/protection: Same-sex partner       Family History   Problem Relation Age of Onset    Breast cancer Sister     Sudden death Father 68    Malig Hyperthermia Neg Hx        Review of Systems   HENT:  Negative for trouble swallowing.    Gastrointestinal: Negative.        Vitals:    23 0940   BP:  143/52   Pulse: 68   Temp: 96.9 °F (36.1 °C)   SpO2: 95%       Physical Exam  Constitutional:       Appearance: He is well-developed.   Abdominal:      General: Bowel sounds are normal. There is no distension.      Palpations: Abdomen is soft. There is no mass.      Tenderness: There is no abdominal tenderness. There is no guarding.      Hernia: No hernia is present.   Skin:     General: Skin is warm and dry.      Capillary Refill: Capillary refill takes less than 2 seconds.   Neurological:      Mental Status: He is alert and oriented to person, place, and time.   Psychiatric:         Behavior: Behavior normal.     Assessment    Diagnoses and all orders for this visit:    1. Crohn's disease of both small and large intestine with rectal bleeding (Primary)  -     C-reactive Protein  -     Sedimentation Rate  -     QuantiFERON-TB Gold Plus  -     CBC & Differential  -     Comprehensive Metabolic Panel  -     Hepatitis B Core Antibody, IgM  -     Hepatitis Panel,  Acute  -     Vitamin D 25 Hydroxy  -     Iron and TIBC  -     Vitamin B12 and Folate  -     Ferritin    Plan    Stable Crohn's continue every 8 week and Entyvio infusions    We did discuss that immunosuppressive therapy puts the patient at higher risk for infection; patient is aware and will take appropriate precautions. I did  on staying updated on vaccines, including annual flu shot. The patient is also aware of the rare but increased risk of malignancy, such as lymphoma and skin cancer, with biologic or immunosuppressive therapy as well. I also counseled the patient to follow with a dermatologist annually and sunscreen use.     Recommend labs today as above.    RTC 6 months to see Dr. Herbert.  Call with questions or concerns in the interim.         MAURI Ford  [facility]  [address] [place] [address]  Office: [phone]

## 2023-12-20 LAB
25(OH)D3+25(OH)D2 SERPL-MCNC: 21.3 NG/ML (ref 30–100)
ALBUMIN SERPL-MCNC: 4.8 G/DL (ref 3.8–4.9)
ALBUMIN/GLOB SERPL: 2.2 {RATIO} (ref 1.2–2.2)
ALP SERPL-CCNC: 64 IU/L (ref 44–121)
ALT SERPL-CCNC: 41 IU/L (ref 0–44)
AST SERPL-CCNC: 26 IU/L (ref 0–40)
BASOPHILS # BLD AUTO: 0.1 X10E3/UL (ref 0–0.2)
BASOPHILS NFR BLD AUTO: 1 %
BILIRUB SERPL-MCNC: 0.3 MG/DL (ref 0–1.2)
BUN SERPL-MCNC: 12 MG/DL (ref 6–24)
BUN/CREAT SERPL: 12 (ref 9–20)
CALCIUM SERPL-MCNC: 9.5 MG/DL (ref 8.7–10.2)
CHLORIDE SERPL-SCNC: 105 MMOL/L (ref 96–106)
CO2 SERPL-SCNC: 25 MMOL/L (ref 20–29)
CREAT SERPL-MCNC: 1.03 MG/DL (ref 0.76–1.27)
CRP SERPL-MCNC: 3 MG/L (ref 0–10)
EGFRCR SERPLBLD CKD-EPI 2021: 85 ML/MIN/1.73
EOSINOPHIL # BLD AUTO: 0.2 X10E3/UL (ref 0–0.4)
EOSINOPHIL NFR BLD AUTO: 4 %
ERYTHROCYTE [DISTWIDTH] IN BLOOD BY AUTOMATED COUNT: 12.8 % (ref 11.6–15.4)
ERYTHROCYTE [SEDIMENTATION RATE] IN BLOOD BY WESTERGREN METHOD: 16 MM/HR (ref 0–30)
FERRITIN SERPL-MCNC: 163 NG/ML (ref 30–400)
FOLATE SERPL-MCNC: 16.5 NG/ML
GLOBULIN SER CALC-MCNC: 2.2 G/DL (ref 1.5–4.5)
GLUCOSE SERPL-MCNC: 103 MG/DL (ref 70–99)
HAV IGM SERPL QL IA: NEGATIVE
HBV CORE IGM SERPL QL IA: NEGATIVE
HBV SURFACE AG SERPL QL IA: NEGATIVE
HCT VFR BLD AUTO: 42.5 % (ref 37.5–51)
HCV AB SERPL QL IA: NORMAL
HCV IGG SERPL QL IA: NON REACTIVE
HGB BLD-MCNC: 14.6 G/DL (ref 13–17.7)
IMM GRANULOCYTES # BLD AUTO: 0 X10E3/UL (ref 0–0.1)
IMM GRANULOCYTES NFR BLD AUTO: 0 %
IRON SATN MFR SERPL: 22 % (ref 15–55)
IRON SERPL-MCNC: 70 UG/DL (ref 38–169)
LYMPHOCYTES # BLD AUTO: 1.9 X10E3/UL (ref 0.7–3.1)
LYMPHOCYTES NFR BLD AUTO: 40 %
MCH RBC QN AUTO: 30 PG (ref 26.6–33)
MCHC RBC AUTO-ENTMCNC: 34.4 G/DL (ref 31.5–35.7)
MCV RBC AUTO: 87 FL (ref 79–97)
MONOCYTES # BLD AUTO: 0.4 X10E3/UL (ref 0.1–0.9)
MONOCYTES NFR BLD AUTO: 7 %
NEUTROPHILS # BLD AUTO: 2.3 X10E3/UL (ref 1.4–7)
NEUTROPHILS NFR BLD AUTO: 48 %
PLATELET # BLD AUTO: 247 X10E3/UL (ref 150–450)
POTASSIUM SERPL-SCNC: 5 MMOL/L (ref 3.5–5.2)
PROT SERPL-MCNC: 7 G/DL (ref 6–8.5)
RBC # BLD AUTO: 4.87 X10E6/UL (ref 4.14–5.8)
SODIUM SERPL-SCNC: 142 MMOL/L (ref 134–144)
TIBC SERPL-MCNC: 321 UG/DL (ref 250–450)
UIBC SERPL-MCNC: 251 UG/DL (ref 111–343)
VIT B12 SERPL-MCNC: 423 PG/ML (ref 232–1245)
WBC # BLD AUTO: 4.9 X10E3/UL (ref 3.4–10.8)

## 2023-12-21 LAB
GAMMA INTERFERON BACKGROUND BLD IA-ACNC: 0.02 IU/ML
M TB IFN-G BLD-IMP: NEGATIVE
M TB IFN-G CD4+ T-CELLS BLD-ACNC: 0.01 IU/ML
M TBIFN-G CD4+ CD8+T-CELLS BLD-ACNC: 0.02 IU/ML
MITOGEN IGNF BLD-ACNC: >10 IU/ML
QUANTIFERON INCUBATION: NORMAL
SERVICE CMNT-IMP: NORMAL

## 2024-01-16 ENCOUNTER — OFFICE VISIT (OUTPATIENT)
Dept: FAMILY MEDICINE CLINIC | Facility: CLINIC | Age: 59
End: 2024-01-16
Payer: COMMERCIAL

## 2024-01-16 VITALS
OXYGEN SATURATION: 96 % | TEMPERATURE: 97.1 F | BODY MASS INDEX: 37.79 KG/M2 | DIASTOLIC BLOOD PRESSURE: 98 MMHG | HEIGHT: 72 IN | SYSTOLIC BLOOD PRESSURE: 140 MMHG | HEART RATE: 72 BPM | WEIGHT: 279 LBS

## 2024-01-16 DIAGNOSIS — N52.8 OTHER MALE ERECTILE DYSFUNCTION: ICD-10-CM

## 2024-01-16 DIAGNOSIS — E78.2 MIXED HYPERLIPIDEMIA: ICD-10-CM

## 2024-01-16 DIAGNOSIS — E66.09 EXOGENOUS OBESITY: ICD-10-CM

## 2024-01-16 DIAGNOSIS — N48.6 PEYRONIE'S DISEASE: ICD-10-CM

## 2024-01-16 DIAGNOSIS — E55.9 VITAMIN D DEFICIENCY: ICD-10-CM

## 2024-01-16 DIAGNOSIS — I10 ESSENTIAL HYPERTENSION: Primary | ICD-10-CM

## 2024-01-16 PROCEDURE — 99214 OFFICE O/P EST MOD 30 MIN: CPT | Performed by: FAMILY MEDICINE

## 2024-01-16 RX ORDER — LISINOPRIL AND HYDROCHLOROTHIAZIDE 20; 12.5 MG/1; MG/1
2 TABLET ORAL DAILY
Qty: 180 TABLET | Refills: 1 | Status: SHIPPED | OUTPATIENT
Start: 2024-01-16

## 2024-01-17 NOTE — PROGRESS NOTES
Subjective   Orlando Friedman is a 58 y.o. male with   Chief Complaint   Patient presents with    Hypertension    Erectile Dysfunction     Med options   .    Hypertension    Erectile Dysfunction       58-year-old white male with known history of essential hypertension here for further medical management.  Current medications include lisinopril/hydrochlorothiazide at 20/12.5 using 1 daily.  Patient has been using this product on a regular basis and finds that it is well-tolerated without side effects.  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  He also has history of exogenous obesity, glucose intolerance, vitamin D deficiency as well as mixed hyperlipidemia.  Last fasting labs were in July 2023.  Patient is currently not fasting.  He does have history of Crohn's disease and is followed by Dr. Herbert of the GI service.  He is using IV Entyvio which has helped stabilize the situation.  He does have a long history of degenerative disc disease with a very rare use of hydrocodone.  He has also been struggling with erectile dysfunction.  His libido is normal but has difficulty both acquiring an erection as well as sustaining the erection.  He has used sildenafil in his past with mixed results.  He does get increased headaches with the use of this product.  Patient also complains of Dann's disease.  When he is able to achieve an erection it apparently bends and has an area where circumferentially seems very narrow.  The following portions of the patient's history were reviewed and updated as appropriate: allergies, current medications, past family history, past medical history, past social history, past surgical history and problem list.    Review of Systems   Cardiovascular:         Hypertension   Endocrine:        Morbid exogenous obesity, vitamin D deficiency, glucose intolerance   Genitourinary:         Erectile dysfunction        Objective     Vitals:    01/16/24 1002   BP: 140/98   Pulse: 72   Temp: 97.1 °F (36.2 °C)   SpO2: 96%       No results found for this or any previous visit (from the past 672 hour(s)).    Physical Exam  Vitals and nursing note reviewed.   Constitutional:       Appearance: Normal appearance. He is well-developed and well-groomed. He is morbidly obese.      Comments: Morbid exogenous obesity with a BMI of 37.8-4 pound weight loss since last visit.   HENT:      Head: Normocephalic and atraumatic.   Neck:      Thyroid: No thyroid mass or thyromegaly.      Vascular: Normal carotid pulses. No carotid bruit.      Trachea: Trachea and phonation normal.   Cardiovascular:      Rate and Rhythm: Normal rate and regular rhythm.      Heart sounds: Normal heart sounds. No murmur heard.     No friction rub. No gallop.   Pulmonary:      Effort: Pulmonary effort is normal. No respiratory distress.      Breath sounds: Normal breath sounds. No decreased breath sounds, wheezing, rhonchi or rales.   Musculoskeletal:      Cervical back: Neck supple.   Lymphadenopathy:      Cervical: No cervical adenopathy.   Skin:     General: Skin is warm and dry.      Findings: No rash.   Neurological:      Mental Status: He is alert and oriented to person, place, and time.   Psychiatric:         Attention and Perception: Attention and perception normal.         Mood and Affect: Mood and affect normal.         Speech: Speech normal.         Behavior: Behavior normal. Behavior is cooperative.         Thought Content: Thought content normal.         Cognition and Memory: Cognition and memory normal.         Judgment: Judgment normal.         Assessment & Plan   Diagnoses and all orders for this visit:    1. Essential hypertension (Primary)  -     lisinopril-hydrochlorothiazide (PRINZIDE,ZESTORETIC) 20-12.5 MG per tablet; Take 2 tablets by mouth Daily.  Dispense: 180 tablet; Refill: 1  -     Comprehensive metabolic panel; Future  -     PSA DIAGNOSTIC  ONLY; Future  -     TSH; Future  -     Vitamin D 25 hydroxy; Future  -     Lipid panel; Future  -     Hemoglobin A1c; Future  -     CBC w AUTO Differential; Future    2. Mixed hyperlipidemia  -     Comprehensive metabolic panel; Future  -     PSA DIAGNOSTIC ONLY; Future  -     TSH; Future  -     Vitamin D 25 hydroxy; Future  -     Lipid panel; Future  -     Hemoglobin A1c; Future  -     CBC w AUTO Differential; Future    3. Exogenous obesity  -     Comprehensive metabolic panel; Future  -     PSA DIAGNOSTIC ONLY; Future  -     TSH; Future  -     Vitamin D 25 hydroxy; Future  -     Lipid panel; Future  -     Hemoglobin A1c; Future  -     CBC w AUTO Differential; Future    4. Vitamin D deficiency  -     Comprehensive metabolic panel; Future  -     PSA DIAGNOSTIC ONLY; Future  -     TSH; Future  -     Vitamin D 25 hydroxy; Future  -     Lipid panel; Future  -     Hemoglobin A1c; Future  -     CBC w AUTO Differential; Future    5. Peyronie's disease  -     Ambulatory Referral to Urology  -     Comprehensive metabolic panel; Future  -     PSA DIAGNOSTIC ONLY; Future  -     TSH; Future  -     Vitamin D 25 hydroxy; Future  -     Lipid panel; Future  -     Hemoglobin A1c; Future  -     CBC w AUTO Differential; Future    6. Other male erectile dysfunction  -     Ambulatory Referral to Urology  -     Comprehensive metabolic panel; Future  -     PSA DIAGNOSTIC ONLY; Future  -     TSH; Future  -     Vitamin D 25 hydroxy; Future  -     Lipid panel; Future  -     Hemoglobin A1c; Future  -     CBC w AUTO Differential; Future        Return in about 4 weeks (around 2/13/2024) for Recheck.

## 2024-01-20 DIAGNOSIS — M51.36 DEGENERATIVE DISC DISEASE, LUMBAR: ICD-10-CM

## 2024-01-20 DIAGNOSIS — Z79.899 HIGH RISK MEDICATION USE: ICD-10-CM

## 2024-01-20 DIAGNOSIS — K50.111 CROHN'S DISEASE OF LARGE INTESTINE WITH RECTAL BLEEDING: ICD-10-CM

## 2024-01-20 DIAGNOSIS — E55.9 VITAMIN D DEFICIENCY: ICD-10-CM

## 2024-01-20 DIAGNOSIS — I10 ESSENTIAL HYPERTENSION: ICD-10-CM

## 2024-01-20 DIAGNOSIS — K50.011 CROHN'S DISEASE OF SMALL INTESTINE WITH RECTAL BLEEDING: ICD-10-CM

## 2024-01-20 DIAGNOSIS — N52.9 IMPOTENCE OF ORGANIC ORIGIN: ICD-10-CM

## 2024-01-20 DIAGNOSIS — N40.0 BENIGN PROSTATIC HYPERPLASIA WITHOUT URINARY OBSTRUCTION: ICD-10-CM

## 2024-01-20 DIAGNOSIS — E66.09 EXOGENOUS OBESITY: ICD-10-CM

## 2024-01-22 RX ORDER — HYDROCODONE BITARTRATE AND ACETAMINOPHEN 5; 325 MG/1; MG/1
1 TABLET ORAL EVERY 6 HOURS PRN
Qty: 40 TABLET | Refills: 0 | Status: SHIPPED | OUTPATIENT
Start: 2024-01-22

## 2024-11-05 ENCOUNTER — OFFICE VISIT (OUTPATIENT)
Dept: FAMILY MEDICINE CLINIC | Facility: CLINIC | Age: 59
End: 2024-11-05
Payer: COMMERCIAL

## 2024-11-05 VITALS
BODY MASS INDEX: 37.63 KG/M2 | TEMPERATURE: 97.3 F | HEIGHT: 72 IN | WEIGHT: 277.8 LBS | HEART RATE: 84 BPM | OXYGEN SATURATION: 94 % | SYSTOLIC BLOOD PRESSURE: 122 MMHG | DIASTOLIC BLOOD PRESSURE: 74 MMHG

## 2024-11-05 DIAGNOSIS — K50.011 CROHN'S DISEASE OF SMALL INTESTINE WITH RECTAL BLEEDING: ICD-10-CM

## 2024-11-05 DIAGNOSIS — I10 ESSENTIAL HYPERTENSION: ICD-10-CM

## 2024-11-05 DIAGNOSIS — E55.9 VITAMIN D DEFICIENCY: ICD-10-CM

## 2024-11-05 DIAGNOSIS — K50.111 CROHN'S DISEASE OF LARGE INTESTINE WITH RECTAL BLEEDING: ICD-10-CM

## 2024-11-05 DIAGNOSIS — Z79.899 HIGH RISK MEDICATION USE: ICD-10-CM

## 2024-11-05 DIAGNOSIS — E78.2 MIXED HYPERLIPIDEMIA: Primary | ICD-10-CM

## 2024-11-05 DIAGNOSIS — E66.09 EXOGENOUS OBESITY: ICD-10-CM

## 2024-11-05 DIAGNOSIS — N52.8 OTHER MALE ERECTILE DYSFUNCTION: ICD-10-CM

## 2024-11-05 DIAGNOSIS — G47.33 OSA (OBSTRUCTIVE SLEEP APNEA): ICD-10-CM

## 2024-11-05 DIAGNOSIS — N52.9 IMPOTENCE OF ORGANIC ORIGIN: ICD-10-CM

## 2024-11-05 DIAGNOSIS — M51.360 DEGENERATION OF INTERVERTEBRAL DISC OF LUMBAR REGION WITH DISCOGENIC BACK PAIN: ICD-10-CM

## 2024-11-05 DIAGNOSIS — M51.369 DEGENERATIVE DISC DISEASE, LUMBAR: ICD-10-CM

## 2024-11-05 DIAGNOSIS — N40.0 BENIGN PROSTATIC HYPERPLASIA WITHOUT URINARY OBSTRUCTION: ICD-10-CM

## 2024-11-05 PROCEDURE — 99214 OFFICE O/P EST MOD 30 MIN: CPT | Performed by: FAMILY MEDICINE

## 2024-11-05 RX ORDER — TADALAFIL 5 MG/1
1 TABLET ORAL DAILY
COMMUNITY
Start: 2024-09-14

## 2024-11-05 RX ORDER — IBUPROFEN 800 MG/1
800 TABLET, FILM COATED ORAL EVERY 8 HOURS PRN
Qty: 90 TABLET | Refills: 3 | Status: SHIPPED | OUTPATIENT
Start: 2024-11-05

## 2024-11-05 RX ORDER — LISINOPRIL AND HYDROCHLOROTHIAZIDE 12.5; 2 MG/1; MG/1
2 TABLET ORAL DAILY
Qty: 180 TABLET | Refills: 1 | Status: SHIPPED | OUTPATIENT
Start: 2024-11-05

## 2024-11-05 RX ORDER — HYDROCODONE BITARTRATE AND ACETAMINOPHEN 5; 325 MG/1; MG/1
1 TABLET ORAL EVERY 6 HOURS PRN
Qty: 40 TABLET | Refills: 0 | Status: SHIPPED | OUTPATIENT
Start: 2024-11-05

## 2024-11-06 NOTE — PROGRESS NOTES
Subjective   Orlando Friedman is a 59 y.o. male with   Chief Complaint   Patient presents with    Hypertension    Hyperlipidemia   .    Hypertension    Hyperlipidemia       59-year-old white male multiple medical issues here for further medical management.  Patient with known history of hypertension, hyperlipidemia as well as morbid exogenous obesity.  He also has complaint of questionable skin lesion in the right forehead area.  This lesion was present for most of the summer and did not seem to want to heal.  He however has noticed this somewhat resolved in the last several weeks.  He also has history of severe degenerative disc disease of the lumbar spine.  Current medications include lisinopril/hydrochlorothiazide, ibuprofen, tadalafil as well as Entyvio for Crohn's disease.  He has also used hydrocodone on a sparing but as needed basis.  All medications are used appropriately.  Last fasting labs have not been performed since December of last year.  Patient is currently not fasting.  The following portions of the patient's history were reviewed and updated as appropriate: allergies, current medications, past family history, past medical history, past social history, past surgical history and problem list.    Review of Systems   Cardiovascular:         Hypertension, hyperlipidemia   Gastrointestinal:         Crohn's disease   Genitourinary:         Erectile dysfunction   Musculoskeletal:  Positive for back pain.       Objective     Vitals:    11/05/24 1406   BP: 122/74   Pulse: 84   Temp: 97.3 °F (36.3 °C)   SpO2: 94%       No results found for this or any previous visit (from the past 4 weeks).    Physical Exam  Vitals and nursing note reviewed.   Constitutional:       Appearance: Normal appearance. He is well-developed and well-groomed. He is morbidly obese.      Comments: Morbid exogenous obesity with a BMI of 37.7   HENT:      Head: Normocephalic and atraumatic.   Neck:      Thyroid: No thyroid mass or  thyromegaly.      Vascular: Normal carotid pulses. No carotid bruit.      Trachea: Trachea and phonation normal.   Cardiovascular:      Rate and Rhythm: Normal rate and regular rhythm.      Heart sounds: Normal heart sounds. No murmur heard.     No friction rub. No gallop.   Pulmonary:      Effort: Pulmonary effort is normal. No respiratory distress.      Breath sounds: Normal breath sounds. No decreased breath sounds, wheezing, rhonchi or rales.   Musculoskeletal:      Cervical back: Neck supple.   Lymphadenopathy:      Cervical: No cervical adenopathy.   Skin:     General: Skin is warm and dry.      Findings: No rash.   Neurological:      Mental Status: He is alert and oriented to person, place, and time.   Psychiatric:         Attention and Perception: Attention and perception normal.         Mood and Affect: Mood and affect normal.         Speech: Speech normal.         Behavior: Behavior normal. Behavior is cooperative.         Thought Content: Thought content normal.         Cognition and Memory: Cognition and memory normal.         Judgment: Judgment normal.         Assessment & Plan   Diagnoses and all orders for this visit:    1. Mixed hyperlipidemia (Primary)  -     Comprehensive metabolic panel; Future  -     PSA DIAGNOSTIC ONLY; Future  -     TSH; Future  -     Vitamin D 25 hydroxy; Future  -     Lipid panel; Future  -     CBC w AUTO Differential; Future  -     Hemoglobin A1c; Future    2. Essential hypertension  -     HYDROcodone-acetaminophen (NORCO) 5-325 MG per tablet; Take 1 tablet by mouth Every 6 (Six) Hours As Needed for Moderate Pain.  Dispense: 40 tablet; Refill: 0  -     lisinopril-hydrochlorothiazide (PRINZIDE,ZESTORETIC) 20-12.5 MG per tablet; Take 2 tablets by mouth Daily.  Dispense: 180 tablet; Refill: 1  -     ibuprofen (ADVIL,MOTRIN) 800 MG tablet; Take 1 tablet by mouth Every 8 (Eight) Hours As Needed for Mild Pain.  Dispense: 90 tablet; Refill: 3  -     Comprehensive metabolic panel;  Future  -     PSA DIAGNOSTIC ONLY; Future  -     TSH; Future  -     Vitamin D 25 hydroxy; Future  -     Lipid panel; Future  -     CBC w AUTO Differential; Future  -     Hemoglobin A1c; Future    3. Vitamin D deficiency  -     HYDROcodone-acetaminophen (NORCO) 5-325 MG per tablet; Take 1 tablet by mouth Every 6 (Six) Hours As Needed for Moderate Pain.  Dispense: 40 tablet; Refill: 0  -     ibuprofen (ADVIL,MOTRIN) 800 MG tablet; Take 1 tablet by mouth Every 8 (Eight) Hours As Needed for Mild Pain.  Dispense: 90 tablet; Refill: 3  -     Comprehensive metabolic panel; Future  -     PSA DIAGNOSTIC ONLY; Future  -     TSH; Future  -     Vitamin D 25 hydroxy; Future  -     Lipid panel; Future  -     CBC w AUTO Differential; Future  -     Hemoglobin A1c; Future    4. Exogenous obesity  -     HYDROcodone-acetaminophen (NORCO) 5-325 MG per tablet; Take 1 tablet by mouth Every 6 (Six) Hours As Needed for Moderate Pain.  Dispense: 40 tablet; Refill: 0  -     ibuprofen (ADVIL,MOTRIN) 800 MG tablet; Take 1 tablet by mouth Every 8 (Eight) Hours As Needed for Mild Pain.  Dispense: 90 tablet; Refill: 3  -     Comprehensive metabolic panel; Future  -     PSA DIAGNOSTIC ONLY; Future  -     TSH; Future  -     Vitamin D 25 hydroxy; Future  -     Lipid panel; Future  -     CBC w AUTO Differential; Future  -     Hemoglobin A1c; Future    5. Crohn's disease of small intestine with rectal bleeding  -     HYDROcodone-acetaminophen (NORCO) 5-325 MG per tablet; Take 1 tablet by mouth Every 6 (Six) Hours As Needed for Moderate Pain.  Dispense: 40 tablet; Refill: 0  -     ibuprofen (ADVIL,MOTRIN) 800 MG tablet; Take 1 tablet by mouth Every 8 (Eight) Hours As Needed for Mild Pain.  Dispense: 90 tablet; Refill: 3  -     Comprehensive metabolic panel; Future  -     PSA DIAGNOSTIC ONLY; Future  -     TSH; Future  -     Vitamin D 25 hydroxy; Future  -     Lipid panel; Future  -     CBC w AUTO Differential; Future  -     Hemoglobin A1c; Future    6.  Other male erectile dysfunction  -     Comprehensive metabolic panel; Future  -     PSA DIAGNOSTIC ONLY; Future  -     TSH; Future  -     Vitamin D 25 hydroxy; Future  -     Lipid panel; Future  -     CBC w AUTO Differential; Future  -     Hemoglobin A1c; Future    7. Benign prostatic hypertrophy without urinary obstruction  -     HYDROcodone-acetaminophen (NORCO) 5-325 MG per tablet; Take 1 tablet by mouth Every 6 (Six) Hours As Needed for Moderate Pain.  Dispense: 40 tablet; Refill: 0  -     ibuprofen (ADVIL,MOTRIN) 800 MG tablet; Take 1 tablet by mouth Every 8 (Eight) Hours As Needed for Mild Pain.  Dispense: 90 tablet; Refill: 3  -     Comprehensive metabolic panel; Future  -     PSA DIAGNOSTIC ONLY; Future  -     TSH; Future  -     Vitamin D 25 hydroxy; Future  -     Lipid panel; Future  -     CBC w AUTO Differential; Future  -     Hemoglobin A1c; Future    8. Degeneration of intervertebral disc of lumbar region with discogenic back pain  -     Comprehensive metabolic panel; Future  -     PSA DIAGNOSTIC ONLY; Future  -     TSH; Future  -     Vitamin D 25 hydroxy; Future  -     Lipid panel; Future  -     CBC w AUTO Differential; Future  -     Hemoglobin A1c; Future    9. RICH (obstructive sleep apnea)  -     Comprehensive metabolic panel; Future  -     PSA DIAGNOSTIC ONLY; Future  -     TSH; Future  -     Vitamin D 25 hydroxy; Future  -     Lipid panel; Future  -     CBC w AUTO Differential; Future  -     Hemoglobin A1c; Future    10. Degenerative disc disease, lumbar  -     HYDROcodone-acetaminophen (NORCO) 5-325 MG per tablet; Take 1 tablet by mouth Every 6 (Six) Hours As Needed for Moderate Pain.  Dispense: 40 tablet; Refill: 0  -     ibuprofen (ADVIL,MOTRIN) 800 MG tablet; Take 1 tablet by mouth Every 8 (Eight) Hours As Needed for Mild Pain.  Dispense: 90 tablet; Refill: 3  -     Comprehensive metabolic panel; Future  -     PSA DIAGNOSTIC ONLY; Future  -     TSH; Future  -     Vitamin D 25 hydroxy; Future  -      Lipid panel; Future  -     CBC w AUTO Differential; Future  -     Hemoglobin A1c; Future    11. High risk medication use  -     HYDROcodone-acetaminophen (NORCO) 5-325 MG per tablet; Take 1 tablet by mouth Every 6 (Six) Hours As Needed for Moderate Pain.  Dispense: 40 tablet; Refill: 0  -     ibuprofen (ADVIL,MOTRIN) 800 MG tablet; Take 1 tablet by mouth Every 8 (Eight) Hours As Needed for Mild Pain.  Dispense: 90 tablet; Refill: 3  -     Comprehensive metabolic panel; Future  -     PSA DIAGNOSTIC ONLY; Future  -     TSH; Future  -     Vitamin D 25 hydroxy; Future  -     Lipid panel; Future  -     CBC w AUTO Differential; Future  -     Hemoglobin A1c; Future    12. Crohn's disease of large intestine with rectal bleeding  -     HYDROcodone-acetaminophen (NORCO) 5-325 MG per tablet; Take 1 tablet by mouth Every 6 (Six) Hours As Needed for Moderate Pain.  Dispense: 40 tablet; Refill: 0  -     ibuprofen (ADVIL,MOTRIN) 800 MG tablet; Take 1 tablet by mouth Every 8 (Eight) Hours As Needed for Mild Pain.  Dispense: 90 tablet; Refill: 3  -     Comprehensive metabolic panel; Future  -     PSA DIAGNOSTIC ONLY; Future  -     TSH; Future  -     Vitamin D 25 hydroxy; Future  -     Lipid panel; Future  -     CBC w AUTO Differential; Future  -     Hemoglobin A1c; Future    13. Impotence of organic origin  -     HYDROcodone-acetaminophen (NORCO) 5-325 MG per tablet; Take 1 tablet by mouth Every 6 (Six) Hours As Needed for Moderate Pain.  Dispense: 40 tablet; Refill: 0  -     ibuprofen (ADVIL,MOTRIN) 800 MG tablet; Take 1 tablet by mouth Every 8 (Eight) Hours As Needed for Mild Pain.  Dispense: 90 tablet; Refill: 3  -     Comprehensive metabolic panel; Future  -     PSA DIAGNOSTIC ONLY; Future  -     TSH; Future  -     Vitamin D 25 hydroxy; Future  -     Lipid panel; Future  -     CBC w AUTO Differential; Future  -     Hemoglobin A1c; Future        Return in about 6 months (around 5/5/2025) for Recheck.  Class 2 Severe Obesity  (BMI >=35 and <=39.9). Obesity-related health conditions include the following: obstructive sleep apnea, hypertension, impaired fasting glucose, dyslipidemias, GERD, and osteoarthritis. Obesity is unchanged. BMI is is above average; BMI management plan is completed. We discussed portion control and increasing exercise.

## 2024-11-16 DIAGNOSIS — E78.2 MIXED HYPERLIPIDEMIA: Primary | ICD-10-CM

## 2024-11-16 DIAGNOSIS — I10 ESSENTIAL HYPERTENSION: ICD-10-CM

## 2024-11-16 DIAGNOSIS — E66.09 EXOGENOUS OBESITY: ICD-10-CM

## 2024-11-20 ENCOUNTER — TELEPHONE (OUTPATIENT)
Dept: INTERNAL MEDICINE | Facility: CLINIC | Age: 59
End: 2024-11-20
Payer: COMMERCIAL

## 2024-11-20 NOTE — TELEPHONE ENCOUNTER
Minna from [facility], received an order for the patient. Looks like he has not been seen before by them. Information That they have does not have his phone number. If you can refax with that information to [phone]. Again, we just need that phone number so that we can reach out and get that scheduled. Thank you.

## 2025-01-29 DIAGNOSIS — D22.9 ATYPICAL NEVUS: Primary | ICD-10-CM

## 2025-03-25 ENCOUNTER — OFFICE VISIT (OUTPATIENT)
Dept: GASTROENTEROLOGY | Facility: CLINIC | Age: 60
End: 2025-03-25
Payer: COMMERCIAL

## 2025-03-25 ENCOUNTER — TELEPHONE (OUTPATIENT)
Dept: GASTROENTEROLOGY | Facility: CLINIC | Age: 60
End: 2025-03-25
Payer: COMMERCIAL

## 2025-03-25 VITALS
HEIGHT: 72 IN | DIASTOLIC BLOOD PRESSURE: 73 MMHG | SYSTOLIC BLOOD PRESSURE: 129 MMHG | WEIGHT: 281.2 LBS | HEART RATE: 76 BPM | BODY MASS INDEX: 38.09 KG/M2 | TEMPERATURE: 97.3 F

## 2025-03-25 DIAGNOSIS — K50.811 CROHN'S DISEASE OF BOTH SMALL AND LARGE INTESTINE WITH RECTAL BLEEDING: Primary | ICD-10-CM

## 2025-03-25 PROCEDURE — 99214 OFFICE O/P EST MOD 30 MIN: CPT | Performed by: NURSE PRACTITIONER

## 2025-03-25 NOTE — PROGRESS NOTES
Chief Complaint   Patient presents with    Crohn's disease of both small and large intestine with rect       HPI    Orlando Friedman is a  60 y.o. male here for a follow up visit for Crohn's disease of the small and large intestine diagnosed in his 30s.    This patient will follow with Dr. Franklin, he is known to me.    Patient has been maintained on Entyvio infusions every 8 weeks.  He reports doing well from a GI standpoint.  He does occasionally get bright red blood per rectum but this is rare.  Denies abdominal pain.  Bowels move daily.  Denies diarrhea or constipation.  Denies extraintestinal manifestations of Crohn's disease.  He is due for routine lab work for insurance approval.    No upper GI complaints.  His appetite is good.  His weight is stable.  BMI 38.13.    Past Medical History:   Diagnosis Date    Arthritis 3/30/2017    Back pain     Benign prostatic hypertrophy without urinary obstruction 03/30/2017    Colitis     Colon polyp     Crohn's disease     Degenerative disc disease, lumbar 03/30/2017    Elevated cholesterol     Hypertension     Impotence of organic origin 03/30/2017    Sebaceous cyst     Sleep apnea     WEARS CPAP    Ulcerative colitis     Vitamin D deficiency 03/30/2017       Past Surgical History:   Procedure Laterality Date    COLONOSCOPY  2013 approx    Crohns per patiient    COLONOSCOPY N/A 5/31/2017    One 3 mm polyp in the cecum,.  PATH: MILD POLYPOID HYPERPLASIA, CHRONIC ACTIVE INFLAMMATION, MODERATE TO SEVERE    COLONOSCOPY N/A 8/26/2020    Procedure: COLONOSCOPY with biopsies;  Surgeon: Erwin Herbert MD;  Location: [facility] ENDOSCOPY;  Service: Gastroenterology;  Laterality: N/A;  pre- hx polyps, hx chron's  post-- transverse lipoma, chron's colitis    CYST REMOVAL N/A     EXCISION MASS TRUNK N/A 10/19/2022    Procedure: MASS SOFT TISSUE EXCISION OF THE BACK X3;  Surgeon: Janine uQeen MD;  Location: [facility] MAIN OR;  Service: General;  Laterality: N/A;    WISDOM TOOTH  EXTRACTION         Scheduled Meds:     Continuous Infusions: No current facility-administered medications for this visit.      PRN Meds:     No Known Allergies    Social History     Socioeconomic History    Marital status:    Tobacco Use    Smoking status: Former     Current packs/day: 0.00     Average packs/day: 1.5 packs/day for 10.0 years (15.0 ttl pk-yrs)     Types: Cigarettes     Start date: 1999     Quit date: 2009     Years since quittin.2    Smokeless tobacco: Never    Tobacco comments:     CAFFEINE USE: ONE CUP DAILY.   Vaping Use    Vaping status: Never Used   Substance and Sexual Activity    Alcohol use: Yes     Alcohol/week: 10.0 standard drinks of alcohol     Types: 10 Cans of beer per week    Drug use: No    Sexual activity: Yes     Partners: Female     Birth control/protection: Partner of same sex       Family History   Problem Relation Age of Onset    Breast cancer Sister     Sudden death Father 68    Malig Hyperthermia Neg Hx        Review of Systems   Gastrointestinal: Negative.        Vitals:    25 1442   BP: 129/73   Pulse: 76   Temp: 97.3 °F (36.3 °C)       Physical Exam  Constitutional:       Appearance: He is well-developed.   Abdominal:      General: Bowel sounds are normal. There is no distension.      Palpations: Abdomen is soft. There is no mass.      Tenderness: There is no abdominal tenderness. There is no guarding.      Hernia: No hernia is present.   Skin:     General: Skin is warm and dry.      Capillary Refill: Capillary refill takes less than 2 seconds.   Neurological:      Mental Status: He is alert and oriented to person, place, and time.   Psychiatric:         Behavior: Behavior normal.     Assessment    Diagnoses and all orders for this visit:    1. Crohn's disease of both small and large intestine with rectal bleeding (Primary)  -     CBC (No Diff)  -     Comprehensive Metabolic Panel  -     Iron and TIBC  -     Vitamin B12 and Folate  -     Vitamin D  25 Hydroxy  -     C-reactive Protein  -     Sedimentation Rate  -     QuantiFERON TB Gold  -     Hepatitis B Surface Antigen  -     Case Request; Standing  -     Implement Anesthesia orders day of procedure.; Standing  -     Follow Anesthesia Guidelines / Protocol; Future  -     Verify bowel prep was successful; Standing  -     Give tap water enema if bowel prep was insufficient; Standing  -     Case Request    Plan    Stable Crohn's disease, continue Entyvio infusions  Schedule screening colonoscopy with Dr. Franklin  Labs today as above  Await endoscopic findings for further recommendations and follow-up         MAURI Ford  [facility]  [address] [place] [address]  Office: [phone]    I spent 30 minutes caring for Orlando on this date of service. This time includes time spent by me in the following activities: preparing for the visit, reviewing tests, obtaining and/or reviewing a separately obtained history, performing a medically appropriate examination and/or evaluation , counseling and educating the patient/family/caregiver, ordering medications, tests, or procedures, documenting information in the medical record, independently interpreting results and communicating that information with the patient/family/caregiver and care coordination.

## 2025-03-25 NOTE — TELEPHONE ENCOUNTER
HIMANSHU Jeronimo for COLONOSCOPY on 6/5/25  arrive at 12:30  . Gave prep instructions to pt in office....Clesiena per Jerica fournier

## 2025-04-23 ENCOUNTER — TELEPHONE (OUTPATIENT)
Dept: GASTROENTEROLOGY | Facility: CLINIC | Age: 60
End: 2025-04-23
Payer: COMMERCIAL

## 2025-04-23 NOTE — TELEPHONE ENCOUNTER
Received reminder patient has not had his labwork drawn.   Patient called. He states he will have his lab work drawn sometime this week.

## 2025-04-30 ENCOUNTER — TELEPHONE (OUTPATIENT)
Dept: GASTROENTEROLOGY | Facility: CLINIC | Age: 60
End: 2025-04-30
Payer: COMMERCIAL

## 2025-04-30 NOTE — TELEPHONE ENCOUNTER
Returned phone call to spouse. Advised the patient may go to [place] outpatient lab to have her blood work drawn. She verb understanding.

## 2025-04-30 NOTE — TELEPHONE ENCOUNTER
Caller: ElderOfelia    Relationship: Emergency Contact    Best call back number: [phone]        Who are you requesting to speak with (clinical staff, provider,  specific staff member): CLINICAL      What was the call regarding: PTS EC CALLING TO GET INSTRUCTION ON WHERE PT SHOULD GO TO HAVE LAB WORK COMPLETED. PLEASE CONTACT TO ASSIST.

## 2025-05-14 ENCOUNTER — LAB (OUTPATIENT)
Dept: LAB | Facility: HOSPITAL | Age: 60
End: 2025-05-14
Payer: COMMERCIAL

## 2025-05-14 LAB
25(OH)D3 SERPL-MCNC: 23 NG/ML (ref 30–100)
ALBUMIN SERPL-MCNC: 4.7 G/DL (ref 3.5–5.2)
ALBUMIN/GLOB SERPL: 1.6 G/DL
ALP SERPL-CCNC: 71 U/L (ref 39–117)
ALT SERPL W P-5'-P-CCNC: 29 U/L (ref 1–41)
ANION GAP SERPL CALCULATED.3IONS-SCNC: 8.2 MMOL/L (ref 5–15)
AST SERPL-CCNC: 23 U/L (ref 1–40)
BILIRUB SERPL-MCNC: 0.6 MG/DL (ref 0–1.2)
BUN SERPL-MCNC: 18 MG/DL (ref 6–20)
BUN/CREAT SERPL: 23.1 (ref 7–25)
CALCIUM SPEC-SCNC: 9.4 MG/DL (ref 8.6–10.5)
CHLORIDE SERPL-SCNC: 104 MMOL/L (ref 98–107)
CO2 SERPL-SCNC: 28.8 MMOL/L (ref 22–29)
CREAT SERPL-MCNC: 0.78 MG/DL (ref 0.76–1.27)
CRP SERPL-MCNC: 0.31 MG/DL (ref 0–0.5)
DEPRECATED RDW RBC AUTO: 42.7 FL (ref 37–54)
EGFRCR SERPLBLD CKD-EPI 2021: 103.4 ML/MIN/1.73
ERYTHROCYTE [DISTWIDTH] IN BLOOD BY AUTOMATED COUNT: 12.9 % (ref 12.3–15.4)
ERYTHROCYTE [SEDIMENTATION RATE] IN BLOOD: 3 MM/HR (ref 0–20)
FOLATE SERPL-MCNC: 13.78 NG/ML (ref 4.78–24.2)
GLOBULIN UR ELPH-MCNC: 2.9 GM/DL
GLUCOSE SERPL-MCNC: 100 MG/DL (ref 65–99)
HBV SURFACE AG SERPL QL IA: NORMAL
HCT VFR BLD AUTO: 42.8 % (ref 37.5–51)
HGB BLD-MCNC: 14 G/DL (ref 13–17.7)
IRON 24H UR-MRATE: 138 MCG/DL (ref 59–158)
IRON SATN MFR SERPL: 32 % (ref 20–50)
MCH RBC QN AUTO: 29.9 PG (ref 26.6–33)
MCHC RBC AUTO-ENTMCNC: 32.7 G/DL (ref 31.5–35.7)
MCV RBC AUTO: 91.5 FL (ref 79–97)
PLATELET # BLD AUTO: 211 10*3/MM3 (ref 140–450)
PMV BLD AUTO: 9.9 FL (ref 6–12)
POTASSIUM SERPL-SCNC: 4.6 MMOL/L (ref 3.5–5.2)
PROT SERPL-MCNC: 7.6 G/DL (ref 6–8.5)
RBC # BLD AUTO: 4.68 10*6/MM3 (ref 4.14–5.8)
SODIUM SERPL-SCNC: 141 MMOL/L (ref 136–145)
TIBC SERPL-MCNC: 425 MCG/DL (ref 298–536)
TRANSFERRIN SERPL-MCNC: 285 MG/DL (ref 200–360)
VIT B12 BLD-MCNC: 433 PG/ML (ref 211–946)
WBC NRBC COR # BLD AUTO: 5.21 10*3/MM3 (ref 3.4–10.8)

## 2025-05-14 PROCEDURE — 85652 RBC SED RATE AUTOMATED: CPT | Performed by: NURSE PRACTITIONER

## 2025-05-14 PROCEDURE — 82306 VITAMIN D 25 HYDROXY: CPT | Performed by: NURSE PRACTITIONER

## 2025-05-14 PROCEDURE — 85027 COMPLETE CBC AUTOMATED: CPT | Performed by: NURSE PRACTITIONER

## 2025-05-14 PROCEDURE — 84466 ASSAY OF TRANSFERRIN: CPT | Performed by: NURSE PRACTITIONER

## 2025-05-14 PROCEDURE — 86140 C-REACTIVE PROTEIN: CPT | Performed by: NURSE PRACTITIONER

## 2025-05-14 PROCEDURE — 83540 ASSAY OF IRON: CPT | Performed by: NURSE PRACTITIONER

## 2025-05-14 PROCEDURE — 82607 VITAMIN B-12: CPT | Performed by: NURSE PRACTITIONER

## 2025-05-14 PROCEDURE — 82746 ASSAY OF FOLIC ACID SERUM: CPT | Performed by: NURSE PRACTITIONER

## 2025-05-14 PROCEDURE — 86480 TB TEST CELL IMMUN MEASURE: CPT | Performed by: NURSE PRACTITIONER

## 2025-05-14 PROCEDURE — 87340 HEPATITIS B SURFACE AG IA: CPT | Performed by: NURSE PRACTITIONER

## 2025-05-14 PROCEDURE — 80053 COMPREHEN METABOLIC PANEL: CPT | Performed by: NURSE PRACTITIONER

## 2025-05-16 LAB
GAMMA INTERFERON BACKGROUND BLD IA-ACNC: 0.1 IU/ML
M TB IFN-G BLD-IMP: NEGATIVE
M TB IFN-G CD4+ BCKGRND COR BLD-ACNC: 0.11 IU/ML
M TB IFN-G CD4+CD8+ BCKGRND COR BLD-ACNC: 0.1 IU/ML
MITOGEN IGNF BCKGRD COR BLD-ACNC: >10 IU/ML
QUANTIFERON INCUBATION: NORMAL
SERVICE CMNT-IMP: NORMAL

## 2025-06-05 ENCOUNTER — ANESTHESIA (OUTPATIENT)
Dept: GASTROENTEROLOGY | Facility: HOSPITAL | Age: 60
End: 2025-06-05
Payer: COMMERCIAL

## 2025-06-05 ENCOUNTER — HOSPITAL ENCOUNTER (OUTPATIENT)
Facility: HOSPITAL | Age: 60
Setting detail: HOSPITAL OUTPATIENT SURGERY
Discharge: HOME | End: 2025-06-05
Attending: INTERNAL MEDICINE | Admitting: INTERNAL MEDICINE
Payer: COMMERCIAL

## 2025-06-05 ENCOUNTER — ANESTHESIA EVENT (OUTPATIENT)
Dept: GASTROENTEROLOGY | Facility: HOSPITAL | Age: 60
End: 2025-06-05
Payer: COMMERCIAL

## 2025-06-05 VITALS
HEIGHT: 72 IN | HEART RATE: 57 BPM | BODY MASS INDEX: 36.3 KG/M2 | SYSTOLIC BLOOD PRESSURE: 134 MMHG | WEIGHT: 268 LBS | OXYGEN SATURATION: 99 % | DIASTOLIC BLOOD PRESSURE: 81 MMHG | RESPIRATION RATE: 19 BRPM

## 2025-06-05 DIAGNOSIS — K50.811 CROHN'S DISEASE OF BOTH SMALL AND LARGE INTESTINE WITH RECTAL BLEEDING: ICD-10-CM

## 2025-06-05 PROCEDURE — 88305 TISSUE EXAM BY PATHOLOGIST: CPT | Performed by: INTERNAL MEDICINE

## 2025-06-05 PROCEDURE — 45385 COLONOSCOPY W/LESION REMOVAL: CPT | Performed by: INTERNAL MEDICINE

## 2025-06-05 PROCEDURE — 25810000003 LACTATED RINGERS PER 1000 ML: Performed by: INTERNAL MEDICINE

## 2025-06-05 PROCEDURE — 25010000002 LIDOCAINE 2% SOLUTION: Performed by: NURSE ANESTHETIST, CERTIFIED REGISTERED

## 2025-06-05 PROCEDURE — 25010000002 PROPOFOL 10 MG/ML EMULSION: Performed by: NURSE ANESTHETIST, CERTIFIED REGISTERED

## 2025-06-05 PROCEDURE — 45380 COLONOSCOPY AND BIOPSY: CPT | Performed by: INTERNAL MEDICINE

## 2025-06-05 DEVICE — REPLAY HEMOSTASIS CLIP, 16MM SPAN
Type: IMPLANTABLE DEVICE | Site: COLON | Status: FUNCTIONAL
Brand: REPLAY

## 2025-06-05 RX ORDER — SODIUM CHLORIDE, SODIUM LACTATE, POTASSIUM CHLORIDE, CALCIUM CHLORIDE 600; 310; 30; 20 MG/100ML; MG/100ML; MG/100ML; MG/100ML
30 INJECTION, SOLUTION INTRAVENOUS CONTINUOUS PRN
Status: DISCONTINUED | OUTPATIENT
Start: 2025-06-05 | End: 2025-06-05 | Stop reason: HOSPADM

## 2025-06-05 RX ORDER — PROPOFOL 10 MG/ML
VIAL (ML) INTRAVENOUS AS NEEDED
Status: DISCONTINUED | OUTPATIENT
Start: 2025-06-05 | End: 2025-06-05 | Stop reason: SURG

## 2025-06-05 RX ORDER — LIDOCAINE HYDROCHLORIDE 20 MG/ML
INJECTION, SOLUTION INFILTRATION; PERINEURAL AS NEEDED
Status: DISCONTINUED | OUTPATIENT
Start: 2025-06-05 | End: 2025-06-05 | Stop reason: SURG

## 2025-06-05 RX ADMIN — LIDOCAINE HYDROCHLORIDE 60 MG: 20 INJECTION, SOLUTION INFILTRATION; PERINEURAL at 14:10

## 2025-06-05 RX ADMIN — PROPOFOL 100 MG: 10 INJECTION, EMULSION INTRAVENOUS at 14:10

## 2025-06-05 RX ADMIN — PROPOFOL 160 MCG/KG/MIN: 10 INJECTION, EMULSION INTRAVENOUS at 14:10

## 2025-06-05 RX ADMIN — SODIUM CHLORIDE, POTASSIUM CHLORIDE, SODIUM LACTATE AND CALCIUM CHLORIDE 30 ML/HR: 600; 310; 30; 20 INJECTION, SOLUTION INTRAVENOUS at 13:23

## 2025-06-05 NOTE — ANESTHESIA PREPROCEDURE EVALUATION
Anesthesia Evaluation     Patient summary reviewed and Nursing notes reviewed   NPO Solid Status: > 8 hours  NPO Liquid Status: > 2 hours           Airway   Mallampati: III  TM distance: >3 FB  Neck ROM: full  Possible difficult intubation and Narrow palate  Dental - normal exam     Pulmonary - normal exam   (+) a smoker Former,sleep apnea  Cardiovascular - normal exam    (+) hypertension, hyperlipidemia      Neuro/Psych- negative ROS  GI/Hepatic/Renal/Endo    (+) obesity, GI bleeding resolved    Musculoskeletal     (+) back pain  Abdominal    Substance History - negative use     OB/GYN negative ob/gyn ROS         Other   arthritis,     ROS/Med Hx Other: Crohn's                    Anesthesia Plan    ASA 3     MAC       Anesthetic plan, risks, benefits, and alternatives have been provided, discussed and informed consent has been obtained with: patient.

## 2025-06-05 NOTE — ANESTHESIA POSTPROCEDURE EVALUATION
"Patient: Orlando Friedman    Procedure Summary       Date: 06/05/25 Room / Location: [facility] ENDOSCOPY 10 /  [facility]    Anesthesia Start: 1407 Anesthesia Stop: 1446    Procedure: COLONOSCOPY to cecum with cold snare polypectomy, cold biopsies and polypectomies, hot snare polypectomies, clip placement x2 Diagnosis:       Crohn's disease of both small and large intestine with rectal bleeding      (Crohn's disease of both small and large intestine with rectal bleeding [K50.811])    Surgeons: Daryl Franklin MD Provider: Jerome Bonds MD    Anesthesia Type: MAC ASA Status: 3            Anesthesia Type: MAC    Vitals  Vitals Value Taken Time   /81 06/05/25 15:02   Temp     Pulse 60 06/05/25 15:03   Resp 19 06/05/25 15:02   SpO2 95 % 06/05/25 15:03   Vitals shown include unfiled device data.        Post Anesthesia Care and Evaluation    Level of consciousness: awake and alert  Pain management: adequate  Anesthetic complications: No anesthetic complications    Cardiovascular status: acceptable  Respiratory status: acceptable  Hydration status: acceptable    Comments: /81 (BP Location: Left arm, Patient Position: Lying)   Pulse 57   Resp 19   Ht 182.9 cm (72\")   Wt 122 kg (268 lb)   SpO2 99%   BMI 36.35 kg/m²       "

## 2025-06-05 NOTE — DISCHARGE INSTRUCTIONS
For the next 24 hours patient needs to be with a responsible adult.    For 24 hours DO NOT drive, operate machinery, appliances, drink alcohol, make important decisions or sign legal documents.    Start with a light or bland diet if you are feeling sick to your stomach otherwise advance to regular diet as tolerated.    Follow recommendations on procedure report if provided by your doctor.    Call Dr Franklin for problems [phone]    Problems may include but not limited to: large amounts of bleeding, trouble breathing, repeated vomiting, severe unrelieved pain, fever or chills.

## 2025-06-05 NOTE — H&P
[facility]  Pre Procedure History & Physical    Chief Complaint:   Crohns disease    Subjective     HPI:   60 y.o. male here for colonoscopy for hx of CD/UC.     Past Medical History:   Past Medical History:   Diagnosis Date    Arthritis 3/30/2017    Back pain     Benign prostatic hypertrophy without urinary obstruction 03/30/2017    Colitis     Colon polyp     Crohn's disease     Degenerative disc disease, lumbar 03/30/2017    Elevated cholesterol     Hypertension     Impotence of organic origin 03/30/2017    Sebaceous cyst     Sleep apnea     WEARS CPAP    Ulcerative colitis     Vitamin D deficiency 03/30/2017       Past Surgical History:  Past Surgical History:   Procedure Laterality Date    COLONOSCOPY  2013 approx    Crohns per patiient    COLONOSCOPY N/A 5/31/2017    One 3 mm polyp in the cecum,.  PATH: MILD POLYPOID HYPERPLASIA, CHRONIC ACTIVE INFLAMMATION, MODERATE TO SEVERE    COLONOSCOPY N/A 8/26/2020    Procedure: COLONOSCOPY with biopsies;  Surgeon: Erwin Herbert MD;  Location: [facility] ENDOSCOPY;  Service: Gastroenterology;  Laterality: N/A;  pre- hx polyps, hx chron's  post-- transverse lipoma, chron's colitis    CYST REMOVAL N/A     EXCISION MASS TRUNK N/A 10/19/2022    Procedure: MASS SOFT TISSUE EXCISION OF THE BACK X3;  Surgeon: Janine Queen MD;  Location: [facility] MAIN OR;  Service: General;  Laterality: N/A;    WISDOM TOOTH EXTRACTION         Family History:  Family History   Problem Relation Age of Onset    Breast cancer Sister     Sudden death Father 68    Malig Hyperthermia Neg Hx        Social History:   reports that he quit smoking about 16 years ago. His smoking use included cigarettes. He started smoking about 26 years ago. He has a 15 pack-year smoking history. He has never used smokeless tobacco. He reports current alcohol use of about 10.0 standard drinks of alcohol per week. He reports that he does not use drugs.    Medications:   No medications prior to  "admission.       Allergies:  Patient has no known allergies.    ROS:    Pertinent items are noted in HPI     Objective     Blood pressure 134/81, pulse 57, resp. rate 19, height 182.9 cm (72\"), weight 122 kg (268 lb), SpO2 99%.    Physical Exam   Constitutional: Pt is oriented to person, place, and time and well-developed, well-nourished, and in no distress.   Mouth/Throat: Oropharynx is clear and moist.   Neck: Normal range of motion.   Cardiovascular: Normal rate, regular rhythm and normal heart sounds.    Pulmonary/Chest: Effort normal and breath sounds normal.   Abdominal: Soft. Nontender  Skin: Skin is warm and dry.   Psychiatric: Mood, memory, affect and judgment normal.     Assessment & Plan     Diagnosis:  Crohns disease    Anticipated Surgical Procedure:  Colonoscopy    The risks, benefits, and alternatives of this procedure have been discussed with the patient or the responsible party- the patient understands and agrees to proceed.                                                            "

## 2025-06-25 ENCOUNTER — TELEPHONE (OUTPATIENT)
Dept: GASTROENTEROLOGY | Facility: CLINIC | Age: 60
End: 2025-06-25

## 2025-06-25 NOTE — TELEPHONE ENCOUNTER
Caller: LENNIE BATISTA     Relationship: [employer]OUSE     Best call back number: [phone]     Requested Prescriptions: ENTYVIO   Requested Prescriptions      No prescriptions requested or ordered in this encounter        Pharmacy where request should be sent: [facility]     Last office visit with prescribing clinician: 3/25/2025   Last telemedicine visit with prescribing clinician: Visit date not found   Next office visit with prescribing clinician: Visit date not found     Additional details provided by patient:     Does the patient have less than a 3 day supply:  [x] Yes  [] No    Would you like a call back once the refill request has been completed: [x] Yes [] No    If the office needs to give you a call back, can they leave a voicemail: [x] Yes [] No    Jim Smith   06/25/25 16:32 EDT

## 2025-06-30 ENCOUNTER — TELEPHONE (OUTPATIENT)
Dept: GASTROENTEROLOGY | Facility: CLINIC | Age: 60
End: 2025-06-30

## 2025-06-30 NOTE — TELEPHONE ENCOUNTER
Called and informed patient that I have done this and by tomorrow I should have it faxed to [facility].

## 2025-06-30 NOTE — TELEPHONE ENCOUNTER
Filled out what I could on form and printed out needed information and gave to Jerica to review and sign.

## 2025-06-30 NOTE — TELEPHONE ENCOUNTER
Group Topic: Education    Start Time: 12:30 PM  End Time: 1:30 PM    Focus: Meal Plan Compliance  Number in attendance: 8    Pt ate 100%   Spouse called ,  stated     Bill from [facility]  [phone] ( phone) saying they have not received the order for entyvio     Please send again

## 2025-06-30 NOTE — TELEPHONE ENCOUNTER
Caller: Ofelia Friedman    Relationship: Emergency Contact    Best call back number: [phone]    What is the best time to reach you: ANYTIME    Who are you requesting to speak with (clinical staff, provider,  specific staff member): CLINICAL    Do you know the name of the person who called: OFELIA FRIEDMAN - PT'S WIFE    What was the call regarding: THE ORDER FOR THE PT'S ENTYVIO INFUSION HAS STILL NOT BEEN SENT.   SHE WOULD LIKE A CALL BACK FROM [facility] OR HER NURSE/MA TODAY.  SHE HAS BEEN TRYING TO GET THIS ORDER SENT SINCE LAST THURSDAY.   PLEASE ADVISE.

## 2025-07-02 ENCOUNTER — TELEPHONE (OUTPATIENT)
Dept: GASTROENTEROLOGY | Facility: CLINIC | Age: 60
End: 2025-07-02
Payer: COMMERCIAL

## 2025-07-02 NOTE — TELEPHONE ENCOUNTER
Hub staff attempted to follow warm transfer process and was unsuccessful     Caller: Ofelia Friedman    Relationship to patient: Emergency Contact    Best call back number: [phone]    Patient is needing: PTS WIFE CALLING TO FU ON ENTYVIO PRESCRIPTION. PLEASE SEE NOTES FROM 06.30.25. PLEASE CONTACT PT WIFE TO PROVIDE AN UPDATE. SHE STATES SHE HAS GOTTEN DIFFERENT ANSWERS FROM DIFFERENT PEOPLE.  PT IS OUT OF HIS MEDICATION AND MISSED HIS APPT. URGENT

## 2025-07-02 NOTE — TELEPHONE ENCOUNTER
Called and spoke to patient's wife and informed her that I have faxed everything they should need to [facility] today.

## 2025-08-26 ENCOUNTER — OFFICE VISIT (OUTPATIENT)
Dept: FAMILY MEDICINE CLINIC | Facility: CLINIC | Age: 60
End: 2025-08-26
Payer: COMMERCIAL

## 2025-08-26 VITALS
WEIGHT: 275.8 LBS | OXYGEN SATURATION: 95 % | HEART RATE: 71 BPM | TEMPERATURE: 97.5 F | BODY MASS INDEX: 37.36 KG/M2 | HEIGHT: 72 IN | SYSTOLIC BLOOD PRESSURE: 128 MMHG | DIASTOLIC BLOOD PRESSURE: 78 MMHG

## 2025-08-26 DIAGNOSIS — G47.33 OSA (OBSTRUCTIVE SLEEP APNEA): ICD-10-CM

## 2025-08-26 DIAGNOSIS — K50.00 CROHN'S DISEASE OF SMALL INTESTINE WITHOUT COMPLICATION: ICD-10-CM

## 2025-08-26 DIAGNOSIS — E78.2 MIXED HYPERLIPIDEMIA: ICD-10-CM

## 2025-08-26 DIAGNOSIS — E55.9 VITAMIN D DEFICIENCY: ICD-10-CM

## 2025-08-26 DIAGNOSIS — I10 ESSENTIAL HYPERTENSION: ICD-10-CM

## 2025-08-26 DIAGNOSIS — R73.02 GLUCOSE INTOLERANCE (IMPAIRED GLUCOSE TOLERANCE): ICD-10-CM

## 2025-08-26 DIAGNOSIS — Z00.01 ENCOUNTER FOR WELL ADULT EXAM WITH ABNORMAL FINDINGS: Primary | ICD-10-CM

## 2025-08-26 DIAGNOSIS — M51.360 DEGENERATION OF INTERVERTEBRAL DISC OF LUMBAR REGION WITH DISCOGENIC BACK PAIN: ICD-10-CM

## 2025-08-26 DIAGNOSIS — N52.8 OTHER MALE ERECTILE DYSFUNCTION: ICD-10-CM

## 2025-08-26 DIAGNOSIS — E66.09 EXOGENOUS OBESITY: ICD-10-CM

## 2025-08-26 PROCEDURE — 99396 PREV VISIT EST AGE 40-64: CPT | Performed by: FAMILY MEDICINE

## 2025-08-27 DIAGNOSIS — N40.0 BENIGN PROSTATIC HYPERPLASIA WITHOUT URINARY OBSTRUCTION: ICD-10-CM

## 2025-08-27 DIAGNOSIS — E55.9 VITAMIN D DEFICIENCY: ICD-10-CM

## 2025-08-27 DIAGNOSIS — K50.011 CROHN'S DISEASE OF SMALL INTESTINE WITH RECTAL BLEEDING: ICD-10-CM

## 2025-08-27 DIAGNOSIS — N52.9 IMPOTENCE OF ORGANIC ORIGIN: ICD-10-CM

## 2025-08-27 DIAGNOSIS — Z79.899 HIGH RISK MEDICATION USE: ICD-10-CM

## 2025-08-27 DIAGNOSIS — M51.369 DEGENERATIVE DISC DISEASE, LUMBAR: ICD-10-CM

## 2025-08-27 DIAGNOSIS — K50.111 CROHN'S DISEASE OF LARGE INTESTINE WITH RECTAL BLEEDING: ICD-10-CM

## 2025-08-27 DIAGNOSIS — I10 ESSENTIAL HYPERTENSION: ICD-10-CM

## 2025-08-27 DIAGNOSIS — E66.09 EXOGENOUS OBESITY: ICD-10-CM

## 2025-08-27 RX ORDER — LISINOPRIL AND HYDROCHLOROTHIAZIDE 12.5; 2 MG/1; MG/1
2 TABLET ORAL DAILY
Qty: 180 TABLET | Refills: 1 | Status: SHIPPED | OUTPATIENT
Start: 2025-08-27

## 2025-08-27 RX ORDER — TADALAFIL 5 MG/1
5 TABLET ORAL DAILY
Qty: 90 TABLET | Refills: 1 | Status: SHIPPED | OUTPATIENT
Start: 2025-08-27

## 2025-08-27 RX ORDER — IBUPROFEN 800 MG/1
800 TABLET, FILM COATED ORAL EVERY 8 HOURS PRN
Qty: 90 TABLET | Refills: 1 | Status: SHIPPED | OUTPATIENT
Start: 2025-08-27

## 2025-08-27 RX ORDER — HYDROCODONE BITARTRATE AND ACETAMINOPHEN 5; 325 MG/1; MG/1
1 TABLET ORAL EVERY 6 HOURS PRN
Qty: 40 TABLET | Refills: 0 | Status: SHIPPED | OUTPATIENT
Start: 2025-08-27

## 2025-08-29 PROBLEM — K50.00 CROHN'S DISEASE OF SMALL INTESTINE WITHOUT COMPLICATION: Status: ACTIVE | Noted: 2017-05-31

## (undated) DEVICE — CANN O2 ETCO2 FITS ALL CONN CO2 SMPL A/ 7IN DISP LF

## (undated) DEVICE — SINGLE-USE BIOPSY FORCEPS: Brand: RADIAL JAW 4

## (undated) DEVICE — GLV SURG BIOGEL LTX PF 6 1/2

## (undated) DEVICE — ADAPT CLN BIOGUARD AIR/H2O DISP

## (undated) DEVICE — Device: Brand: DEFENDO AIR/WATER/SUCTION AND BIOPSY VALVE

## (undated) DEVICE — TBG PENCL TELESCP MEGADYNE SMOKE EVAC 10FT

## (undated) DEVICE — LASSO POLYPECTOMY SNARE: Brand: LASSO

## (undated) DEVICE — TUBING, SUCTION, 1/4" X 10', STRAIGHT: Brand: MEDLINE

## (undated) DEVICE — TRAP FLD MINIVAC MEGADYNE 100ML

## (undated) DEVICE — LOU MINOR PROCEDURE: Brand: MEDLINE INDUSTRIES, INC.

## (undated) DEVICE — LN SMPL CO2 SHTRM SD STREAM W/M LUER

## (undated) DEVICE — NDL HYPO PRECISIONGLIDE REG 25G 1 1/2

## (undated) DEVICE — KT ORCA ORCAPOD DISP STRL

## (undated) DEVICE — PATIENT RETURN ELECTRODE, SINGLE-USE, CONTACT QUALITY MONITORING, ADULT, WITH 9FT CORD, FOR PATIENTS WEIGING OVER 33LBS. (15KG): Brand: MEGADYNE

## (undated) DEVICE — CANN NASL CO2 TRULINK W/O2 A/

## (undated) DEVICE — SENSR O2 OXIMAX FNGR A/ 18IN NONSTR

## (undated) DEVICE — ADHS SKIN SURG TISS VISC PREMIERPRO EXOFIN HI/VISC FAST/DRY

## (undated) DEVICE — APPL CHLORAPREP HI/LITE 26ML ORNG

## (undated) DEVICE — THE SINGLE USE ETRAP – POLYP TRAP IS USED FOR SUCTION RETRIEVAL OF ENDOSCOPICALLY REMOVED POLYPS.: Brand: ETRAP

## (undated) DEVICE — THE TORRENT IRRIGATION SCOPE CONNECTOR IS USED WITH THE TORRENT IRRIGATION TUBING TO PROVIDE IRRIGATION FLUIDS SUCH AS STERILE WATER DURING GASTROINTESTINAL ENDOSCOPIC PROCEDURES WHEN USED IN CONJUNCTION WITH AN IRRIGATION PUMP (OR ELECTROSURGICAL UNIT).: Brand: TORRENT

## (undated) DEVICE — ANTIBACTERIAL UNDYED BRAIDED (POLYGLACTIN 910), SYNTHETIC ABSORBABLE SUTURE: Brand: COATED VICRYL